# Patient Record
Sex: MALE | Race: WHITE | URBAN - NONMETROPOLITAN AREA
[De-identification: names, ages, dates, MRNs, and addresses within clinical notes are randomized per-mention and may not be internally consistent; named-entity substitution may affect disease eponyms.]

---

## 2017-01-20 ENCOUNTER — ANTICOAGULATION THERAPY VISIT (OUTPATIENT)
Dept: ANTICOAGULATION | Facility: OTHER | Age: 72
End: 2017-01-20
Payer: COMMERCIAL

## 2017-01-20 DIAGNOSIS — I48.0 PAROXYSMAL ATRIAL FIBRILLATION (H): ICD-10-CM

## 2017-01-20 DIAGNOSIS — I48.19 PERSISTENT ATRIAL FIBRILLATION (H): ICD-10-CM

## 2017-01-20 DIAGNOSIS — Z79.01 LONG-TERM (CURRENT) USE OF ANTICOAGULANTS: Primary | ICD-10-CM

## 2017-01-20 LAB — INR POINT OF CARE: 3 (ref 0.86–1.14)

## 2017-01-20 PROCEDURE — 85610 PROTHROMBIN TIME: CPT | Mod: QW

## 2017-01-20 PROCEDURE — 99207 ZZC NO CHARGE NURSE ONLY: CPT

## 2017-01-20 PROCEDURE — 36416 COLLJ CAPILLARY BLOOD SPEC: CPT

## 2017-01-20 NOTE — PROGRESS NOTES
ANTICOAGULATION FOLLOW-UP CLINIC VISIT    Patient Name:  Brodie Cai  Date:  1/20/2017  Contact Type:  Face to Face    SUBJECTIVE:     Patient Findings     Positives No Problem Findings           OBJECTIVE    INR PROTIME   Date Value Ref Range Status   01/20/2017 3.0* 0.86 - 1.14 Final       ASSESSMENT / PLAN  INR assessment THER    Recheck INR In: 6 WEEKS    INR Location Clinic      Anticoagulation Summary as of 1/20/2017     INR goal 2.0-3.0   Selected INR 3.0 (1/20/2017)   Maintenance plan 7.5 mg (5 mg x 1.5) on Tue, Thu, Sat; 10 mg (5 mg x 2) all other days   Full instructions 7.5 mg on Tue, Thu, Sat; 10 mg all other days   Weekly total 62.5 mg   No change documented Elizabeth Alcantara RN   Plan last modified Elizabeth Alcantara RN (3/25/2016)   Next INR check 3/3/2017   Target end date     Indications   Long-term (current) use of anticoagulants [Z79.01] [Z79.01]  Persistent atrial fibrillation (H) [I48.1]  Paroxysmal atrial fibrillation (H) [I48.0]         Anticoagulation Episode Summary     INR check location     Preferred lab     Send INR reminders to [facility]    Comments       Anticoagulation Care Providers     Provider Role Specialty Phone number    Joshua Arizmendi DO [facility] Internal Medicine [phone]            See the Encounter Report to view Anticoagulation Flowsheet and Dosing Calendar (Go to Encounters tab in chart review, and find the Anticoagulation Therapy Visit)    Dosage adjustment made based on physician directed care plan.    Elizabeth Alcantara RN

## 2017-01-26 ENCOUNTER — TELEPHONE (OUTPATIENT)
Dept: INTERNAL MEDICINE | Facility: CLINIC | Age: 72
End: 2017-01-26

## 2017-01-26 NOTE — TELEPHONE ENCOUNTER
Panel Management Review      Patient has the following on his problem list:     Diabetes    ASA: Not Required     Last A1C  A1C      8.0   9/27/2016  A1C      8.6   4/5/2016  A1C      6.8   9/10/2015  A1C      7.9   4/28/2015  A1C      7.9   1/20/2015  A1C tested: Failed    Last LDL:    CHOL      119   4/5/2016  HDL       37   4/5/2016  LDL       55   4/5/2016  TRIG      137   4/5/2016  CHOLHDLRATIO      3.4   1/20/2015  NHDL       82   4/5/2016    Is the patient on a Statin? YES             Is the patient on Aspirin? NO    Medications     HMG CoA Reductase Inhibitors    lovastatin (MEVACOR) 40 MG tablet          Last three blood pressure readings:  BP Readings from Last 3 Encounters:   09/27/16 136/92   05/26/16 136/78   04/05/16 118/72       Date of last diabetes office visit: 9/27/2016     Tobacco History:     History   Smoking status     Former Smoker   Smokeless tobacco     Former User     Quit date: 01/01/1986             Composite cancer screening  Chart review shows that this patient is due/due soon for the following None  Summary:    Patient is due/failing the following:   A1C and FOLLOW UP    Action needed:   Patient needs office visit for diabetes.    Type of outreach:    phone    Questions for provider review:    None                                                                                                                                    Miguelina LUEVANO       Chart routed to Care Team .

## 2017-02-01 ENCOUNTER — OFFICE VISIT (OUTPATIENT)
Dept: FAMILY MEDICINE | Facility: OTHER | Age: 72
End: 2017-02-01
Payer: COMMERCIAL

## 2017-02-01 VITALS
DIASTOLIC BLOOD PRESSURE: 82 MMHG | HEART RATE: 88 BPM | HEIGHT: 69 IN | SYSTOLIC BLOOD PRESSURE: 132 MMHG | WEIGHT: 282 LBS | OXYGEN SATURATION: 96 % | TEMPERATURE: 96.4 F | BODY MASS INDEX: 41.77 KG/M2

## 2017-02-01 DIAGNOSIS — I48.19 PERSISTENT ATRIAL FIBRILLATION (H): ICD-10-CM

## 2017-02-01 DIAGNOSIS — I10 HYPERTENSION GOAL BP (BLOOD PRESSURE) < 140/90: ICD-10-CM

## 2017-02-01 DIAGNOSIS — E78.5 HYPERLIPIDEMIA WITH TARGET LDL LESS THAN 100: ICD-10-CM

## 2017-02-01 DIAGNOSIS — R06.02 SOB (SHORTNESS OF BREATH): ICD-10-CM

## 2017-02-01 DIAGNOSIS — I10 ESSENTIAL HYPERTENSION WITH GOAL BLOOD PRESSURE LESS THAN 140/90: ICD-10-CM

## 2017-02-01 DIAGNOSIS — Z13.6 SCREENING FOR ABDOMINAL AORTIC ANEURYSM: ICD-10-CM

## 2017-02-01 DIAGNOSIS — F90.2 ATTENTION DEFICIT HYPERACTIVITY DISORDER (ADHD), COMBINED TYPE: ICD-10-CM

## 2017-02-01 DIAGNOSIS — E11.9 TYPE 2 DIABETES MELLITUS WITHOUT COMPLICATION, WITH LONG-TERM CURRENT USE OF INSULIN (H): Primary | ICD-10-CM

## 2017-02-01 DIAGNOSIS — E66.09 NON MORBID OBESITY DUE TO EXCESS CALORIES: ICD-10-CM

## 2017-02-01 DIAGNOSIS — Z79.4 TYPE 2 DIABETES MELLITUS WITHOUT COMPLICATION, WITH LONG-TERM CURRENT USE OF INSULIN (H): Primary | ICD-10-CM

## 2017-02-01 LAB
ALBUMIN UR-MCNC: NEGATIVE MG/DL
ANION GAP SERPL CALCULATED.3IONS-SCNC: 7 MMOL/L (ref 3–14)
APPEARANCE UR: CLEAR
BILIRUB UR QL STRIP: NEGATIVE
BUN SERPL-MCNC: 16 MG/DL (ref 7–30)
CALCIUM SERPL-MCNC: 9.5 MG/DL (ref 8.5–10.1)
CHLORIDE SERPL-SCNC: 106 MMOL/L (ref 94–109)
CHOLEST SERPL-MCNC: 141 MG/DL
CO2 SERPL-SCNC: 32 MMOL/L (ref 20–32)
COLOR UR AUTO: YELLOW
CREAT SERPL-MCNC: 0.87 MG/DL (ref 0.66–1.25)
GFR SERPL CREATININE-BSD FRML MDRD: 87 ML/MIN/1.7M2
GLUCOSE SERPL-MCNC: 169 MG/DL (ref 70–99)
GLUCOSE UR STRIP-MCNC: NEGATIVE MG/DL
HDLC SERPL-MCNC: 38 MG/DL
HGB UR QL STRIP: NEGATIVE
KETONES UR STRIP-MCNC: NEGATIVE MG/DL
LDLC SERPL CALC-MCNC: 75 MG/DL
LEUKOCYTE ESTERASE UR QL STRIP: NEGATIVE
NITRATE UR QL: NEGATIVE
NONHDLC SERPL-MCNC: 103 MG/DL
PH UR STRIP: 5 PH (ref 5–7)
POTASSIUM SERPL-SCNC: 3.8 MMOL/L (ref 3.4–5.3)
SODIUM SERPL-SCNC: 145 MMOL/L (ref 133–144)
SP GR UR STRIP: 1.01 (ref 1–1.03)
TRIGL SERPL-MCNC: 140 MG/DL
URN SPEC COLLECT METH UR: NORMAL
UROBILINOGEN UR STRIP-ACNC: 0.2 EU/DL (ref 0.2–1)

## 2017-02-01 PROCEDURE — 99397 PER PM REEVAL EST PAT 65+ YR: CPT | Performed by: INTERNAL MEDICINE

## 2017-02-01 PROCEDURE — 81003 URINALYSIS AUTO W/O SCOPE: CPT | Performed by: INTERNAL MEDICINE

## 2017-02-01 PROCEDURE — 80048 BASIC METABOLIC PNL TOTAL CA: CPT | Performed by: INTERNAL MEDICINE

## 2017-02-01 PROCEDURE — 36415 COLL VENOUS BLD VENIPUNCTURE: CPT | Performed by: INTERNAL MEDICINE

## 2017-02-01 PROCEDURE — 80061 LIPID PANEL: CPT | Performed by: INTERNAL MEDICINE

## 2017-02-01 RX ORDER — METOPROLOL TARTRATE 50 MG
TABLET ORAL
Qty: 180 TABLET | Refills: 1 | Status: SHIPPED | OUTPATIENT
Start: 2017-02-01 | End: 2017-04-11

## 2017-02-01 RX ORDER — CLONIDINE HYDROCHLORIDE 0.2 MG/1
0.2 TABLET ORAL 2 TIMES DAILY
Qty: 180 TABLET | Refills: 0 | Status: SHIPPED | OUTPATIENT
Start: 2017-02-01 | End: 2017-05-04

## 2017-02-01 ASSESSMENT — PAIN SCALES - GENERAL: PAINLEVEL: NO PAIN (0)

## 2017-02-01 NOTE — Clinical Note
CARDIAC NUCLEAR IMAGING STRESS TEST INSTRUCTIONS  PAMPHLET:  CARDIAC NUCLEAR IMAGING    Date of Appointment:____________________Time: ________                                                                                     [place] at the central registration desk.    INSTRUCTIONS:    1. NO  CAFFEINE FOR 24 HOURS PRIOR TO THE TEST.  Examples: tea, coffee, pop, Anacin, Excedrin and chocolate products.  Other products/medications may contain caffeine.  If in doubt, read the lables or call your pharmacist.    2. Nothing to eat or drink, except water for four hours prior to the test.    3. No smoking the day of the test.    4. Plan three to four hours for your test.    5. If you wear a NITRO-PATCH, do not put one on the morning of the test.    6. If you are diabetic, see specific Insulin instructions below.    7. Wear a comfortable two piece outfit and comfortable walking shoes.    8. Do not take beta blockers(see list below):  Do not take  Lopressor (day before the test ) and Lopressor (day of the test)    BETA BLOCKERS  Acebutolol, Atenolol, Beta-Chron, Betapace, Betaxolol, Bisoprolol,Blocadren, Carteolol, Cartrol, Carvediol, Coreg, Corgard, Corzide, Dectral, Fumarate, Inderal, Inderal LA, Inderide, Kerlone, Labetolol, Levatolol, Lopressor, Lopressor HCT, Metoprolol, Metoprolol XL, Nadolol, Normodyne, Penbutolol, Pindolol, Propanolol, Propanolol LA, Sectral, Sotalol, Tenoretic, Tenormin, Timolide, Timolol, Toprol XL, Trandate, Visken, Zebeta, Ziac    INSULIN PRODUCTS    For early AM appointments:  Oral, Regular, Humulin R or Humulin Br:  Do not take the morning of the test but bring the dose with you.   Any other Insulin, take   the dosage and bring the rest with.    For 11:00 AM and later appointments:  Eat breakfast four hours prior to appointment time and take AM diabetic medications as usual.    Bring a list of your medications to this appointment.    If you have any questions please call us at [phone] or  [phone] Monday through Friday 8am to 5pm.    PATIENT INFORMATION SHEET - CARDIAC NUCLEAR IMAGING STRESS TEST    You will be asked to fill out an information sheet regarding your current health status.  Questions will be asked related to your heart history.    The nurse or exercise physiologist will explain the procedure to you.  The upper chest will be exposed for placement of electrodes.  Women may wear a hospital gown during the test.  Men with chest hair will be shaved in the areas where the electrodes are placed.    An IV will be started and the first dose of the imaging solution will be given by the .  You will be escorted to the X-Ray waiting area.    There will be a waiting period of twenty to thirty minutes.  This allows the imaging solution to circulate. During this time you will be asked to drink juice or water.  You will also be asked to do leisurely walking to help circulate the imaging solution.    A scan will be performed.  This will take approximately twenty minutes.    There will be a waiting period of thirty to forty minutes.    You will be connected to an electrocardiogram machine.  An electrocardiogram and blood pressure will be monitored with you lying down and again standing still prior to exercising.    With staff in attendance, the treadmill will be started.  Periodically the speed and grade of the treadmill will increase.  Let the staff know if you are experiencing chest pain, shortness of breath or other symptoms.  While you are exercising on the treadmill a second dose of imaging material will be administered by the .    The length of time on the treadmill is individual.  However, the usual length of time is from five to ten minutes of walking.    There will be a 30 to 45 minute wait period while the imaging material circulates in your bloodstream.  You may go down to the hospital cafeteria to have something cold to drink.    You will be escorted  to the X-Ray department where a second scan will be performed.  This will take approximately twenty minutes.     The physician in attendance during your treadmill test will give you the electrocardiogram results after the completion of the test and will send a report to your Primary Care Provider.    The scan is read by the cardiologist at [facility] the following day.  Your primary physician will receive the report within five to seven days following the test.    The test will take approximately three to four hours.   Please bring along reading material to help pass the time while you are waiting.

## 2017-02-01 NOTE — PROGRESS NOTES
SUBJECTIVE:                                                            Brodie Cai is a 71 year old male who presents for Preventive Visit.  Are you in the first 12 months of your Medicare Part B coverage?  No    Healthy Habits:    Do you get at least three servings of calcium containing foods daily (dairy, green leafy vegetables, etc.)? yes    Amount of exercise or daily activities, outside of work: 3 day(s) per week    Problems taking medications regularly No    Medication side effects: No    Have you had an eye exam in the past two years? yes    Do you see a dentist twice per year? yes    Do you have sleep apnea, excessive snoring or daytime drowsiness?yes    COGNITIVE SCREEN  1) Repeat 3 items (Banana, Sunrise, Chair)    2) Clock draw: NORMAL  3) 3 item recall: Recalls 2 objects   Results: NORMAL clock, 1-2 items recalled: COGNITIVE IMPAIRMENT LESS LIKELY    Mini-CogTM Copyright S Calista. Licensed by the author for use in [facility]; reprinted with permission (debbie@[email or web address]). All rights reserved.            All Histories reviewed and updated in [facility] as appropriate.  Social History   Substance Use Topics     Smoking status: Former Smoker     Smokeless tobacco: Former User     Quit date: 01/01/1986     Alcohol Use: 0.0 oz/week     0 Standard drinks or equivalent per week      Comment: once a week        The patient does not drink >3 drinks per day nor >7 drinks per week.    Today's PHQ-2 Score:   PHQ-2 ( 1999 Pf[employer]) 2/1/2017 9/27/2016   Q1: Little interest or pleasure in doing things 0 0   Q2: Feeling down, depressed or hopeless 0 0   PHQ-2 Score 0 0       Do you feel safe in your environment - Yes    Do you have a Health Care Directive?: No: Advance care planning reviewed with patient; information given to patient to review.    Current providers sharing in care for this patient include:   Patient Care Team:  Joshua Arizmendi DO as PCP - General (Internal Medicine)      Hearing  impairment: Yes, has hearing aides    Ability to successfully perform activities of daily living: Yes, no assistance needed     Fall risk:  Fallen 2 or more times in the past year?: No  Any fall with injury in the past year?: No    Home safety:  none identified    The following health maintenance items are reviewed in Epic and correct as of today:  Health Maintenance   Topic Date Due     AORTIC ANEURYSM SCREENING (SYSTEM ASSIGNED)  07/27/2011     PNEUMOCOCCAL (2 of 2 - PPSV23) 11/26/2015     INFLUENZA VACCINE (SYSTEM ASSIGNED)  09/01/2016     EYE EXAM Q1 YEAR( NO INBASKET)  02/18/2017     A1C Q6 MO( NO INBASKET)  03/27/2017     FOOT EXAM Q1 YEAR( NO INBASKET)  04/05/2017     LIPID MONITORING Q1 YEAR( NO INBASKET)  04/05/2017     TSH W/ FREE T4 REFLEX Q2 YEAR (NO INBASKET)  04/28/2017     CREATININE Q1 YEAR (NO INBASKET)  09/27/2017     FALL RISK ASSESSMENT  09/27/2017     MICROALBUMIN Q1 YEAR( NO INBASKET)  09/27/2017     TETANUS IMMUNIZATION (SYSTEM ASSIGNED)  03/17/2019     ADVANCE DIRECTIVE PLANNING Q5 YRS (NO INBASKET)  01/20/2020     COLON CANCER SCREEN (SYSTEM ASSIGNED)  07/02/2024     HEPATITIS C SCREENING  Completed         Pneumonia Vaccine:Adults age 65+ who received Pneumovax (PPSV23) at 65 years or older: Should be given PCV13 > 1 year after their most recent PPSV23     ROS:  C: NEGATIVE for fever, chills, change in weight  I: NEGATIVE for worrisome rashes, moles or lesions  E: NEGATIVE for vision changes or irritation  E/M: NEGATIVE for ear, mouth and throat problems  R: Positive for shortness of breath associated with symptoms consistent with chronic obstructive pulmonary disease. He has dyspnea with exertion.   B: NEGATIVE for masses, tenderness or discharge  CV: Positive for history of atrial fibrillation. Also positive for recent severe dyspnea with exertion and vague nonspecific chest discomfort with exertion.  GI: NEGATIVE for nausea, abdominal pain, heartburn, or change in bowel habits  :  "NEGATIVE for frequency, dysuria, or hematuria  M: NEGATIVE for significant arthralgias or myalgia  N: NEGATIVE for weakness, dizziness or paresthesias  E: NEGATIVE for temperature intolerance, skin/hair changes  H: NEGATIVE for bleeding problems  P: NEGATIVE for changes in mood or affect    Problem list, Medication list, Allergies, and Medical/Social/Surgical histories reviewed in [facility] and updated as appropriate.  Labs reviewed in EPIC  BP Readings from Last 3 Encounters:   02/01/17 132/82   09/27/16 (!) 136/92   05/26/16 136/78    Wt Readings from Last 3 Encounters:   02/01/17 282 lb (127.9 kg)   09/27/16 298 lb (135.2 kg)   05/26/16 296 lb 6.4 oz (134.4 kg)                  OBJECTIVE:                                                            /82 mmHg  Pulse 88  Temp(Src) 96.4  F (35.8  C) (Temporal)  Ht 5' 9\" (1.753 m)  Wt 282 lb (127.914 kg)  BMI 41.63 kg/m2  SpO2 96% Estimated body mass index is 41.63 kg/(m^2) as calculated from the following:    Height as of this encounter: 5' 9\" (1.753 m).    Weight as of this encounter: 282 lb (127.914 kg).  EXAM:   GENERAL: healthy, alert and no distress  EYES: Eyes grossly normal to inspection, PERRL and conjunctivae and sclerae normal  HENT: ear canals and TM's normal, nose and mouth without ulcers or lesions  NECK: no adenopathy, no asymmetry, masses, or scars and thyroid normal to palpation  RESP: lungs clear to auscultation - no rales, rhonchi or wheezes  CV: irregularly irregular rhythm, normal S1 S2, no S3 or S4, no murmur, click or rub, peripheral pulses strong and no peripheral edema  ABDOMEN: soft, nontender, no hepatosplenomegaly, no masses and bowel sounds normal   (male): normal male genitalia without lesions or urethral discharge, no hernia  MS: no gross musculoskeletal defects noted, no edema  SKIN: no suspicious lesions or rashes  NEURO: Normal strength and tone, mentation intact and speech normal  PSYCH: mentation appears normal, affect " "normal/bright  LYMPH: no cervical, supraclavicular, axillary, or inguinal adenopathy  Diabetic foot exam: normal DP and PT pulses, no trophic changes or ulcerative lesions and normal sensory exam    ASSESSMENT / PLAN:                                                                ICD-10-CM    1. Type 2 diabetes mellitus without complication, with long-term current use of insulin (H) E11.9 *UA reflex to Microscopic and Culture ([facility], [place] and [facility] (except [place] and [place])    Z79.4    2. Persistent atrial fibrillation (H) I48.1    3. Hypertension goal BP (blood pressure) < 140/90 I10 cloNIDine (CATAPRES) 0.2 MG tablet     Basic metabolic panel   4. Hyperlipidemia with target LDL less than 100 E78.5 Lipid Profile   5. Essential hypertension with goal blood pressure less than 140/90 I10 metoprolol (LOPRESSOR) 50 MG tablet   6. SOB (shortness of breath) R06.02 General PFT Lab (Please always keep checked)     Pulmonary Function Test     NM Exercise stress test   7. Non morbid obesity due to excess calories E66.09    8. Attention deficit hyperactivity disorder (ADHD), combined type F90.2    9. Screening for abdominal aortic aneurysm Z13.6 US abdominal aorta limited       End of Life Planning:  Patient currently has an advanced directive: Yes.  Practitioner is supportive of decision.    COUNSELING:  Reviewed preventive health counseling, as reflected in patient instructions       Consider AAA screening for ages 65-75 and smoking history       Regular exercise       Healthy diet/nutrition       Vision screening       Hearing screening        Estimated body mass index is 41.63 kg/(m^2) as calculated from the following:    Height as of this encounter: 5' 9\" (1.753 m).    Weight as of this encounter: 282 lb (127.914 kg).  Weight management plan: Discussed healthy diet and exercise guidelines and patient will follow up in 3 months in clinic to re-evaluate.   reports that he has quit smoking. He quit " smokeless tobacco use about 31 years ago.      Appropriate preventive services were discussed with this patient, including applicable screening as appropriate for cardiovascular disease, diabetes, osteopenia/osteoporosis, and glaucoma.  As appropriate for age/gender, discussed screening for colorectal cancer, prostate cancer, breast cancer, and cervical cancer. Checklist reviewing preventive services available has been given to the patient.    Reviewed patients plan of care and provided an AVS. The Basic Care Plan (routine screening as documented in Health Maintenance) for Brodie meets the Care Plan requirement. This Care Plan has been established and reviewed with the Patient.    Counseling Resources:  ATP IV Guidelines  Pooled Cohorts Equation Calculator  Breast Cancer Risk Calculator  FRAX Risk Assessment  ICSI Preventive Guidelines  Dietary Guidelines for Americans, 2010  USDA's MyPlate  ASA Prophylaxis  Lung CA Screening    Joshua Arizmendi DO  [facility]

## 2017-02-01 NOTE — MR AVS SNAPSHOT
After Visit Summary   2/1/2017    Brodie Cai    MRN: [id number]           Patient Information     Date Of Birth          1945        Visit Information        Provider Department      2/1/2017 10:00 AM Joshua Arizmendi DO [facility]        Today's Diagnoses     Type 2 diabetes mellitus without complication, with long-term current use of insulin (H)    -  1     Persistent atrial fibrillation (H)         Hypertension goal BP (blood pressure) < 140/90         Hyperlipidemia with target LDL less than 100         Essential hypertension with goal blood pressure less than 140/90         SOB (shortness of breath)         Non morbid obesity due to excess calories         Attention deficit hyperactivity disorder (ADHD), combined type         Screening for abdominal aortic aneurysm           Care Instructions      Preventive Health Recommendations:       Male Ages 65 and over    Yearly exam:             See your health care provider every year in order to  o   Review health changes.   o   Discuss preventive care.    o   Review your medicines if your doctor has prescribed any.    Talk with your health care provider about whether you should have a test to screen for prostate cancer (PSA).    Every 3 years, have a diabetes test (fasting glucose). If you are at risk for diabetes, you should have this test more often.    Every 5 years, have a cholesterol test. Have this test more often if you are at risk for high cholesterol or heart disease.     Every 10 years, have a colonoscopy. Or, have a yearly FIT test (stool test). These exams will check for colon cancer.    Talk to with your health care provider about screening for Abdominal Aortic Aneurysm if you have a family history of AAA or have a history of smoking.  Shots:     Get a flu shot each year.     Get a tetanus shot every 10 years.     Talk to your doctor about your pneumonia vaccines. There are now two you should receive -  Pneumovax (PPSV 23) and Prevnar (PCV 13).    Talk to your doctor about a shingles vaccine.     Talk to your doctor about the hepatitis B vaccine.  Nutrition:     Eat at least 5 servings of fruits and vegetables each day.     Eat whole-grain bread, whole-wheat pasta and brown rice instead of white grains and rice.     Talk to your doctor about Calcium and Vitamin D.   Lifestyle    Exercise for at least 150 minutes a week (30 minutes a day, 5 days a week). This will help you control your weight and prevent disease.     Limit alcohol to one drink per day.     No smoking.     Wear sunscreen to prevent skin cancer.     See your dentist every six months for an exam and cleaning.     See your eye doctor every 1 to 2 years to screen for conditions such as glaucoma, macular degeneration and cataracts.        Follow-ups after your visit        Your next 10 appointments already scheduled     Feb 07, 2017 12:00 PM   NM MPI ADENOSINE with PHNM1, NL STRESS TEST, PMC RM1   [facility] Nuclear Medicine ([facility])    [address] [place] [id number] [phone]           For a ONE day exam: Allow 3-4 hours for test. For a TWO day exam: Allow 2 hours PER day for test.  You may need to stop some medicines before the test. Follow your doctor s orders. - If you take a beta blocker: Follow your doctor s specific instructions on taking it prior to and on the day of your exam. - If you take Aggrenox or dipyridamole (Persantine, Permole), stop taking it 48 hours before your test. - If you take Viagra, Cialis or Levitra, stop taking it 48 hours before your test. - If you take theophylline or aminophylline, stop taking it 12 hours before your test.  For patients with diabetes: - If you take insulin, call your diabetes care team. Ask if you should take a 1/2 dose the morning of your test. - If you take diabetes medicine by mouth, don t take it on the morning of your test. Bring it with you to take after  the test. (If you have questions, call your diabetes care team.)  Do not take nitrates on the day of your test. Do not wear your Nitro-Patch.  Stop all caffeine 12 hours before the test. This includes coffee, tea, soda pop, chocolate and certain medicines (such as Anacin, Excedrin and NoDoz). Also avoid decaf coffee and tea, as these contain small amounts of caffeine.  No alcohol, smoking or other tobacco for 12 hours before the test.  Stop eating 3 hours before the test. You may drink water.  Please wear a loose two-piece outfit. If you will have an exercise test, bring rubber-soled walking shoes.  When you arrive, please tell us if you: - Have diabetes - Are breastfeeding - May be pregnant - Have a pacemaker of ICD (implantable defibrillator).  Please call your Imaging Department at your exam site with any questions.            Feb 09, 2017 10:00 AM   NM MPI ADENOSINE with PHNM1, NL STRESS TEST, PMC RM1   [facility] Nuclear Medicine ([facility])    [address] [place] [address] [phone]           For a ONE day exam: Allow 3-4 hours for test. For a TWO day exam: Allow 2 hours PER day for test.  You may need to stop some medicines before the test. Follow your doctor s orders. - If you take a beta blocker: Follow your doctor s specific instructions on taking it prior to and on the day of your exam. - If you take Aggrenox or dipyridamole (Persantine, Permole), stop taking it 48 hours before your test. - If you take Viagra, Cialis or Levitra, stop taking it 48 hours before your test. - If you take theophylline or aminophylline, stop taking it 12 hours before your test.  For patients with diabetes: - If you take insulin, call your diabetes care team. Ask if you should take a 1/2 dose the morning of your test. - If you take diabetes medicine by mouth, don t take it on the morning of your test. Bring it with you to take after the test. (If you have questions, call your  diabetes care team.)  Do not take nitrates on the day of your test. Do not wear your Nitro-Patch.  Stop all caffeine 12 hours before the test. This includes coffee, tea, soda pop, chocolate and certain medicines (such as Anacin, Excedrin and NoDoz). Also avoid decaf coffee and tea, as these contain small amounts of caffeine.  No alcohol, smoking or other tobacco for 12 hours before the test.  Stop eating 3 hours before the test. You may drink water.  Please wear a loose two-piece outfit. If you will have an exercise test, bring rubber-soled walking shoes.  When you arrive, please tell us if you: - Have diabetes - Are breastfeeding - May be pregnant - Have a pacemaker of ICD (implantable defibrillator).  Please call your Imaging Department at your exam site with any questions.            Feb 09, 2017 10:30 AM   US ABDOMINAL AORTIC LIMITED with PHUS1   [facility] Ultrasound ([facility])    [address] [place] [address] [phone]           Please bring a list of your medicines (including vitamins, minerals and over-the-counter drugs). Also, tell your doctor about any allergies you may have. Wear comfortable clothes and leave your valuables at home.  Adults: No eating or drinking for 8 hours before the exam. You may take medicine with a small sip of water.  Children: - Children 6+ years: No food or drink for 6 hours before exam. - Children 1-5 years: No food or drink for 4 hours before exam. - Infants, breast-fed: may have breast milk up to 2 hours before exam. - Infants, formula: may have bottle until 4 hours before exam.  Please call the Imaging Department at your exam site with any questions.            Mar 03, 2017 10:30 AM   Anticoagulation Visit with  ANTI COAG   [facility]    [address] [facility] [address] [phone]              Future tests that were ordered for you today     Open Future Orders        Priority  "Expected Expires Ordered    NM Exercise stress test Routine  2018    US abdominal aorta limited Routine  2018    General PFT Lab (Please always keep checked) Routine  2018    Pulmonary Function Test Routine  2018            Who to contact     If you have questions or need follow up information about today's clinic visit or your schedule please contact [facility] directly at [phone].  Normal or non-critical lab and imaging results will be communicated to you by MyChart, letter or phone within 4 business days after the clinic has received the results. If you do not hear from us within 7 days, please contact the clinic through [employer] or phone. If you have a critical or abnormal lab result, we will notify you by phone as soon as possible.  Submit refill requests through [employer] or call your pharmacy and they will forward the refill request to us. Please allow 3 business days for your refill to be completed.          Additional Information About Your Visit        [employer] Information     [employer] lets you send messages to your doctor, view your test results, renew your prescriptions, schedule appointments and more. To sign up, go to [email or web address] . Click on \"Log in\" on the left side of the screen, which will take you to the Welcome page. Then click on \"Sign up Now\" on the right side of the page.     You will be asked to enter the access code listed below, as well as some personal information. Please follow the directions to create your username and password.     Your access code is: [email or web address]  Expires: 2017 10:36 AM     Your access code will  in 90 days. If you need help or a new code, please call your [place] clinic or [phone].        Care EveryWhere ID     This is your Care EveryWhere ID. This could be used by other organizations to access your [place] medical records  [id number]        Your Vitals Were     Pulse " "Temperature Height BMI (Body Mass Index) Pulse Oximetry       88 96.4  F (35.8  C) (Temporal) 5' 9\" (1.753 m) 41.63 kg/m2 96%        Blood Pressure from Last 3 Encounters:   02/01/17 132/82   09/27/16 136/92   05/26/16 136/78    Weight from Last 3 Encounters:   02/01/17 282 lb (127.914 kg)   09/27/16 298 lb (135.172 kg)   05/26/16 296 lb 6.4 oz (134.446 kg)              We Performed the Following     *UA reflex to Microscopic and Culture ([facility], [place] and [facility] (except [place] and [place])     Basic metabolic panel     Lipid Profile          Today's Medication Changes          These changes are accurate as of: 2/1/17 11:01 AM.  If you have any questions, ask your nurse or doctor.               Start taking these medicines.        Dose/Directions    cloNIDine 0.2 MG tablet   Commonly known as:  CATAPRES   Used for:  Hypertension goal BP (blood pressure) < 140/90   Replaces:  cloNIDine 0.2 MG/24HR WK patch   Started by:  Joshua Arizmendi DO        Dose:  0.2 mg   Take 1 tablet (0.2 mg) by mouth 2 times daily   Quantity:  180 tablet   Refills:  0         These medicines have changed or have updated prescriptions.        Dose/Directions    metoprolol 50 MG tablet   Commonly known as:  LOPRESSOR   This may have changed:  medication strength   Used for:  Essential hypertension with goal blood pressure less than 140/90   Changed by:  Joshua Arizmendi DO        TAKE 1 AND 1/2 TABLETS TWICE DAILY   Quantity:  180 tablet   Refills:  1         Stop taking these medicines if you haven't already. Please contact your care team if you have questions.     cloNIDine 0.2 MG/24HR WK patch   Commonly known as:  CATAPRES-TTS2   Replaced by:  cloNIDine 0.2 MG tablet   Stopped by:  Joshua Arizmendi DO                Where to get your medicines      These medications were sent to [facility] [place], [place] - [address]  [address] , [facility] [address]     Phone:  " [phone]    - cloNIDine 0.2 MG tablet  - metoprolol 50 MG tablet             Primary Care Provider Office Phone # Fax #    Joshua Arizmendi [phone]       [address] [place] [address]        Thank you!     Thank you for choosing [facility]  for your care. Our goal is always to provide you with excellent care. Hearing back from our patients is one way we can continue to improve our services. Please take a few minutes to complete the written survey that you may receive in the mail after your visit with us. Thank you!             Your Updated Medication List - Protect others around you: Learn how to safely use, store and throw away your medicines at www.disposemymeds.org.          This list is accurate as of: 2/1/17 11:01 AM.  Always use your most recent med list.                   Brand Name Dispense Instructions for use    Alcohol Wipes 70 % Pads     300 packet    Wipe area before testing.       ALEVE 220 MG tablet   Generic drug:  naproxen sodium      Take 220 mg by mouth 2 times daily (with meals) Take 2 tabs       amLODIPine 10 MG tablet    NORVASC    90 tablet    Take 1 tablet (10 mg) by mouth daily       blood glucose calibration solution     1 Bottle    Use to calibrate blood glucose monitor as needed as directed.       blood glucose monitoring test strip    ACCU-CHEK AURELIO PLUS    1 Box    USE  TO  TEST  BLOOD  SUGAR THREE TIMES DAILY  OR AS DIRECTED       cloNIDine 0.2 MG tablet    CATAPRES    180 tablet    Take 1 tablet (0.2 mg) by mouth 2 times daily       fluticasone-salmeterol 250-50 MCG/DOSE diskus inhaler    ADVAIR    3 Inhaler    Inhale 1 puff into the lungs 2 times daily       furosemide 20 MG tablet    LASIX    90 tablet    Take 1 tablet (20 mg) by mouth daily       glipiZIDE 10 MG tablet    GLUCOTROL    180 tablet    Take 1 tablet (10 mg) by mouth 2 times daily (before meals)       insulin glargine 100 UNIT/ML injection     LANTUS SOLOSTAR    3 Month    50 units ONCE daily       insulin syringes (disposable) U-100 1 ML Misc     3 Box    1 Device 2 times daily       lisinopril 40 MG tablet    PRINIVIL/ZESTRIL    90 tablet    Take 1 tablet (40 mg) by mouth daily       lovastatin 40 MG tablet    MEVACOR    180 tablet    Take 1 tablet (40 mg) by mouth 2 times daily       metFORMIN 1000 MG tablet    GLUCOPHAGE    180 tablet    TAKE 1 TABLET TWICE DAILY WITH MEALS       metoprolol 50 MG tablet    LOPRESSOR    180 tablet    TAKE 1 AND 1/2 TABLETS TWICE DAILY       * order for DME     1 Units    Equipment being ordered: Energizer Watch Battery 3V [id number]       order for DME     1 Device    Equipment being ordered: CPAP       * order for DME     1 Device    Equipment being ordered: CPAP machine and supplies       sildenafil 100 MG cap/tab    REVATIO/VIAGRA    12 tablet    Take 0.5-1 tablets ( mg) by mouth daily as needed for erectile dysfunction Take 30 min to 4 hours before intercourse.  Never use with nitroglycerin, terazosin or doxazosin.       sitagliptin 100 MG tablet    JANUVIA    90 tablet    Take 1 tablet (100 mg) by mouth daily       tamsulosin 0.4 MG capsule    FLOMAX    90 capsule    Take 1 capsule (0.4 mg) by mouth daily       VITAMIN D3 PO      Take 1,000 Units by mouth daily       warfarin 5 MG tablet    COUMADIN    150 tablet    Take  7.5 mg (1.5 tablets) on Tuesday, Thursday, Saturday and 10 mg (2 tabs) the rest of the week or as directed by the coumadin clinic.       * Notice:  This list has 2 medication(s) that are the same as other medications prescribed for you. Read the directions carefully, and ask your doctor or other care provider to review them with you.

## 2017-02-01 NOTE — NURSING NOTE
"Chief Complaint   Patient presents with     Wellness Visit     Medicare Visit       Initial /82 mmHg  Pulse 88  Temp(Src) 96.4  F (35.8  C) (Temporal)  Ht 5' 9\" (1.753 m)  Wt 282 lb (127.914 kg)  BMI 41.63 kg/m2  SpO2 96% Estimated body mass index is 41.63 kg/(m^2) as calculated from the following:    Height as of this encounter: 5' 9\" (1.753 m).    Weight as of this encounter: 282 lb (127.914 kg).  BP completed using cuff size: lupe LUEVANO    "

## 2017-02-07 ENCOUNTER — HOSPITAL ENCOUNTER (OUTPATIENT)
Dept: NUCLEAR MEDICINE | Facility: CLINIC | Age: 72
Setting detail: NUCLEAR MEDICINE
Discharge: HOME | End: 2017-02-09
Attending: INTERNAL MEDICINE | Admitting: INTERNAL MEDICINE
Payer: COMMERCIAL

## 2017-02-07 ENCOUNTER — TELEPHONE (OUTPATIENT)
Dept: FAMILY MEDICINE | Facility: OTHER | Age: 72
End: 2017-02-07

## 2017-02-07 DIAGNOSIS — E11.65 TYPE 2 DIABETES MELLITUS WITH HYPERGLYCEMIA (H): ICD-10-CM

## 2017-02-07 DIAGNOSIS — E11.9 CONTROLLED TYPE 2 DIABETES MELLITUS WITHOUT COMPLICATION, WITH LONG-TERM CURRENT USE OF INSULIN (H): ICD-10-CM

## 2017-02-07 DIAGNOSIS — I48.19 PERSISTENT ATRIAL FIBRILLATION (H): ICD-10-CM

## 2017-02-07 DIAGNOSIS — Z79.4 CONTROLLED TYPE 2 DIABETES MELLITUS WITHOUT COMPLICATION, WITH LONG-TERM CURRENT USE OF INSULIN (H): ICD-10-CM

## 2017-02-07 DIAGNOSIS — R06.02 SOB (SHORTNESS OF BREATH): ICD-10-CM

## 2017-02-07 DIAGNOSIS — I10 ESSENTIAL HYPERTENSION WITH GOAL BLOOD PRESSURE LESS THAN 140/90: ICD-10-CM

## 2017-02-07 DIAGNOSIS — E78.5 HYPERLIPIDEMIA WITH TARGET LDL LESS THAN 100: ICD-10-CM

## 2017-02-07 DIAGNOSIS — E11.9 TYPE 2 DIABETES, HBA1C GOAL < 8% (H): ICD-10-CM

## 2017-02-07 DIAGNOSIS — E11.9 TYPE 2 DIABETES MELLITUS (H): Primary | ICD-10-CM

## 2017-02-07 DIAGNOSIS — I10 HYPERTENSION GOAL BP (BLOOD PRESSURE) < 140/90: ICD-10-CM

## 2017-02-07 PROCEDURE — 25000128 H RX IP 250 OP 636: Performed by: INTERNAL MEDICINE

## 2017-02-07 PROCEDURE — 93018 CV STRESS TEST I&R ONLY: CPT | Performed by: INTERNAL MEDICINE

## 2017-02-07 PROCEDURE — 93016 CV STRESS TEST SUPVJ ONLY: CPT | Performed by: INTERNAL MEDICINE

## 2017-02-07 PROCEDURE — 34300033 ZZH RX 343: Performed by: INTERNAL MEDICINE

## 2017-02-07 PROCEDURE — A9502 TC99M TETROFOSMIN: HCPCS | Performed by: INTERNAL MEDICINE

## 2017-02-07 PROCEDURE — 93017 CV STRESS TEST TRACING ONLY: CPT | Performed by: REHABILITATION PRACTITIONER

## 2017-02-07 RX ORDER — CLONIDINE HYDROCHLORIDE 0.2 MG/1
0.2 TABLET ORAL 2 TIMES DAILY
Qty: 180 TABLET | Refills: 1 | Status: CANCELLED | COMMUNITY
Start: 2017-02-07

## 2017-02-07 RX ORDER — TAMSULOSIN HYDROCHLORIDE 0.4 MG/1
0.4 CAPSULE ORAL DAILY
Qty: 90 CAPSULE | Refills: 1 | Status: CANCELLED | COMMUNITY
Start: 2017-02-07

## 2017-02-07 RX ORDER — REGADENOSON 0.08 MG/ML
0.4 INJECTION, SOLUTION INTRAVENOUS ONCE
Status: COMPLETED | OUTPATIENT
Start: 2017-02-07 | End: 2017-02-07

## 2017-02-07 RX ORDER — LOVASTATIN 40 MG
40 TABLET ORAL 2 TIMES DAILY
Qty: 180 TABLET | Refills: 1 | Status: CANCELLED | COMMUNITY
Start: 2017-02-07

## 2017-02-07 RX ORDER — BLOOD-GLUCOSE METER
EACH MISCELLANEOUS
Qty: 1 KIT | Refills: 0 | Status: CANCELLED | COMMUNITY
Start: 2017-02-07

## 2017-02-07 RX ORDER — BLOOD GLUCOSE CONTROL HIGH,LOW
EACH MISCELLANEOUS
Qty: 3 BOTTLE | Refills: 3 | Status: CANCELLED | COMMUNITY
Start: 2017-02-07

## 2017-02-07 RX ORDER — FUROSEMIDE 20 MG
20 TABLET ORAL DAILY
Qty: 90 TABLET | Refills: 1 | Status: CANCELLED | COMMUNITY
Start: 2017-02-07

## 2017-02-07 RX ORDER — AMLODIPINE BESYLATE 10 MG/1
10 TABLET ORAL DAILY
Qty: 90 TABLET | Refills: 1 | Status: CANCELLED | COMMUNITY
Start: 2017-02-07

## 2017-02-07 RX ORDER — WARFARIN SODIUM 5 MG/1
TABLET ORAL
Qty: 162 TABLET | Refills: 1 | Status: CANCELLED | COMMUNITY
Start: 2017-02-07

## 2017-02-07 RX ORDER — GLIPIZIDE 10 MG/1
10 TABLET ORAL
Qty: 180 TABLET | Refills: 1 | Status: CANCELLED | COMMUNITY
Start: 2017-02-07

## 2017-02-07 RX ORDER — LANCETS
EACH MISCELLANEOUS
Qty: 3 BOX | Refills: 1 | Status: CANCELLED | COMMUNITY
Start: 2017-02-07

## 2017-02-07 RX ORDER — METOPROLOL TARTRATE 50 MG
TABLET ORAL
Qty: 270 TABLET | Refills: 1 | Status: CANCELLED | COMMUNITY
Start: 2017-02-07

## 2017-02-07 RX ORDER — LISINOPRIL 40 MG/1
40 TABLET ORAL DAILY
Qty: 90 TABLET | Refills: 1 | Status: CANCELLED | COMMUNITY
Start: 2017-02-07

## 2017-02-07 RX ADMIN — REGADENOSON 0.4 MG: 0.08 INJECTION, SOLUTION INTRAVENOUS at 10:07

## 2017-02-07 RX ADMIN — Medication 30 MILLICURIE: at 10:15

## 2017-02-09 ENCOUNTER — HOSPITAL ENCOUNTER (OUTPATIENT)
Dept: RESPIRATORY THERAPY | Facility: CLINIC | Age: 72
End: 2017-02-09
Attending: INTERNAL MEDICINE
Payer: COMMERCIAL

## 2017-02-09 ENCOUNTER — HOSPITAL ENCOUNTER (OUTPATIENT)
Dept: NUCLEAR MEDICINE | Facility: CLINIC | Age: 72
Setting detail: NUCLEAR MEDICINE
End: 2017-02-09
Attending: INTERNAL MEDICINE
Payer: COMMERCIAL

## 2017-02-09 ENCOUNTER — HOSPITAL ENCOUNTER (OUTPATIENT)
Dept: ULTRASOUND IMAGING | Facility: CLINIC | Age: 72
Discharge: HOME | End: 2017-02-09
Attending: INTERNAL MEDICINE | Admitting: INTERNAL MEDICINE
Payer: COMMERCIAL

## 2017-02-09 VITALS — OXYGEN SATURATION: 94 %

## 2017-02-09 DIAGNOSIS — R06.02 SOB (SHORTNESS OF BREATH): ICD-10-CM

## 2017-02-09 DIAGNOSIS — Z13.6 SCREENING FOR ABDOMINAL AORTIC ANEURYSM: ICD-10-CM

## 2017-02-09 PROCEDURE — 78452 HT MUSCLE IMAGE SPECT MULT: CPT | Mod: 26 | Performed by: INTERNAL MEDICINE

## 2017-02-09 PROCEDURE — A9502 TC99M TETROFOSMIN: HCPCS | Performed by: INTERNAL MEDICINE

## 2017-02-09 PROCEDURE — 34300033 ZZH RX 343: Performed by: INTERNAL MEDICINE

## 2017-02-09 PROCEDURE — 78452 HT MUSCLE IMAGE SPECT MULT: CPT

## 2017-02-09 PROCEDURE — 93018 CV STRESS TEST I&R ONLY: CPT | Performed by: INTERNAL MEDICINE

## 2017-02-09 RX ORDER — ALBUTEROL SULFATE 0.83 MG/ML
2.5 SOLUTION RESPIRATORY (INHALATION)
Status: COMPLETED | OUTPATIENT
Start: 2017-02-09 | End: 2017-02-09

## 2017-02-09 RX ADMIN — Medication 32 MILLICURIE: at 09:36

## 2017-02-09 RX ADMIN — ALBUTEROL SULFATE 2.5 MG: 2.5 SOLUTION RESPIRATORY (INHALATION) at 14:24

## 2017-02-09 NOTE — LETTER
[facility] ULTRASOUND  [address] [address]  [phone]        February 16, 2017    Brodie Cai  [address] [facility] [address]              Dear Brodie Cai      IMAGING RESULTS:     The results of your recent ultrasound showed  no evidence of an abdominal aortic aneurysm  If you have any further questions or problems, please contact our office.        Sincerely,        Joshua Arizmendi, DO

## 2017-02-09 NOTE — PROGRESS NOTES
The FVC, FEV1, FEV1/FVC ratio and SCS11-14% are reduced indicating airway obstruction.   The inspiratory flow rates are reduced.  The FVC is reduced relative to the SVC indicating air trapping.  The airway resistance is normal.  The SVC is reduced, but the TLC is within normal limits.  Following administration of bronchodilators, there is no significant response.  The diffusing capacity is normal.  However, the diffusing capacity was not corrected for the patient's hemoglobin.    Minimal airway obstruction is present.    IMPRESSION:  Minimal Airflow Obstruction         This preliminary report should not be used clinically unless reviewed and signed by a physician.

## 2017-02-09 NOTE — DISCHARGE INSTRUCTIONS
Thank you for completing pulmonary function testing today.  All results will be scanned into your epic results for your doctor to review.  Please resume taking all your current prescribed medications and diet as directed by your provider.   If you have not heard from your provider about your testing within two weeks and do not have a follow-up appointment scheduled with them please contact your provider about any questions you have concerning your testing.   Thank you  [facility] Pulmonary Function Lab

## 2017-02-09 NOTE — PROGRESS NOTES
Pre-Bronch    Post-Bronch         Actual Pred %Pred  Actual %Pred %Chng       ---- SPIROMETRY ----                          FVC (L) 2.71 4.09 66   2.95 72 +8            FEV1 (L) 1.94 3.10 62   2.19 70 +13            FEV1/FVC (%) 71 76     74   +3            FEV1/SVC (%) 63 68                      FEV1/FEV6 (%) 72 78     75   +5            FEF Max (L/sec) 6.56 8.08 81   6.01 74 -8            FEF 25-75% (L/sec) 1.24 2.37 52   2.05 86 +64            FIVC (L) 2.43       2.56   +5            FIF Max (L/sec) 1.98 6.83 28   3.52 51 +78            Expiratory Time (sec) 7.08       7.47   +5            ----LUNG MECHANICS                           MVV (L/min)   124                      MEP (cmH2O)                          MIP (cmH2O)                          ---- LUNG VOLUMES ----                          SVC (L) 3.09 4.57 67                    IC (L) 2.62 4.25 61                    ERV (L) 0.46 0.32 145                    FRC(Pleth) (L) 3.95 3.65 108                    RV (Pleth) (L) 3.48 2.62 132                    TLC (Pleth) (L) 6.57 6.92 94                    RV/TLC (Pleth) (%) 53 41                      ---- DIFFUSION ----                          DLCOunc (ml/min/mmHg) 23.34 25.60 91                    DLCOcor (ml/min/mmHg)   25.60                      DL/VA (ml/min/mmHg/L) 4.54 3.86                      VA (L) 5.14 6.63 77                    IVC (L) 2.67                        Hgb (gm/dL)   12-18                      ---- BLOOD GASES ----

## 2017-02-13 ENCOUNTER — DOCUMENTATION ONLY (OUTPATIENT)
Dept: OTHER | Facility: CLINIC | Age: 72
End: 2017-02-13

## 2017-02-13 DIAGNOSIS — Z71.89 ADVANCED DIRECTIVES, COUNSELING/DISCUSSION: Chronic | ICD-10-CM

## 2017-02-15 NOTE — PROGRESS NOTES
Please contact the patient and notify him of the following:  The cardiac stress test appears to be normal. No evidence of myocardial ischemia is seen.      Thank you.  DO KELSIE Limon

## 2017-02-15 NOTE — PROGRESS NOTES
Please contact the patient and notify him of the following:  There is no evidence of an abdominal aortic aneurysm.      Thank you.  DO KELSIE Limon

## 2017-02-16 NOTE — PROGRESS NOTES
Please contact the patient and notify him of the following:  His breathing test is consistent with some mild restriction. Pulmonology reading is pending              Thank you.  DO KELSIE Limon

## 2017-02-16 NOTE — PROGRESS NOTES
Please contact the patient and notify him of the following:  The blood sugar was elevated at 169.  Cholesterol is well controlled with an LDL of 75  Urine sample was normal.  The ultrasound of the abdominal aorta was normal.          Thank you.  DO KELSIE Limon

## 2017-03-03 ENCOUNTER — ANTICOAGULATION THERAPY VISIT (OUTPATIENT)
Dept: ANTICOAGULATION | Facility: OTHER | Age: 72
End: 2017-03-03
Payer: COMMERCIAL

## 2017-03-03 DIAGNOSIS — I48.0 PAROXYSMAL ATRIAL FIBRILLATION (H): ICD-10-CM

## 2017-03-03 DIAGNOSIS — Z79.01 LONG-TERM (CURRENT) USE OF ANTICOAGULANTS: ICD-10-CM

## 2017-03-03 DIAGNOSIS — I48.19 PERSISTENT ATRIAL FIBRILLATION (H): ICD-10-CM

## 2017-03-03 LAB — INR POINT OF CARE: 2.4 (ref 0.86–1.14)

## 2017-03-03 PROCEDURE — 99207 ZZC NO CHARGE NURSE ONLY: CPT

## 2017-03-03 PROCEDURE — 36416 COLLJ CAPILLARY BLOOD SPEC: CPT

## 2017-03-03 PROCEDURE — 85610 PROTHROMBIN TIME: CPT | Mod: QW

## 2017-03-03 NOTE — MR AVS SNAPSHOT
Brodie Ayala   3/3/2017 1:15 PM   Anticoagulation Therapy Visit    Description:  71 year old male   Provider:  [facility]   Department:  [facility]           INR as of 3/3/2017     Today's INR 2.4      Anticoagulation Summary as of 3/3/2017     INR goal 2.0-3.0   Today's INR 2.4   Full instructions 7.5 mg on Tue, Thu, Sat; 10 mg all other days   Next INR check 4/14/2017    Indications   Long-term (current) use of anticoagulants [Z79.01] [Z79.01]  Persistent atrial fibrillation (H) [I48.1]  Paroxysmal atrial fibrillation (H) [I48.0]         Your next Anticoagulation Clinic appointment(s)     Apr 14, 2017 10:30 AM CDT   Anticoagulation Visit with [facility] ANTI COAG   [facility]    [address] [facility] [address]   [phone]              Contact Numbers     Clinic Number:         March 2017 Details    Sun Mon Tue Wed Thu Fri Sat        1               2               3      10 mg   See details      4      7.5 mg           5      10 mg         6      10 mg         7      7.5 mg         8      10 mg         9      7.5 mg         10      10 mg         11      7.5 mg           12      10 mg         13      10 mg         14      7.5 mg         15      10 mg         16      7.5 mg         17      10 mg         18      7.5 mg           19      10 mg         20      10 mg         21      7.5 mg         22      10 mg         23      7.5 mg         24      10 mg         25      7.5 mg           26      10 mg         27      10 mg         28      7.5 mg         29      10 mg         30      7.5 mg         31      10 mg           Date Details   03/03 This INR check               How to take your warfarin dose     To take:  7.5 mg Take 1.5 of the 5 mg tablets.    To take:  10 mg Take 2 of the 5 mg tablets.           April 2017 Details    Sun Mon Tue Wed Thu Fri Sat           1      7.5 mg           2      10 mg         3      10 mg         4      7.5 mg         5      10 mg          6      7.5 mg         7      10 mg         8      7.5 mg           9      10 mg         10      10 mg         11      7.5 mg         12      10 mg         13      7.5 mg         14            15                 16               17               18               19               20               21               22                 23               24               25               26               27               28               29                 30                      Date Details   No additional details    Date of next INR:  4/14/2017         How to take your warfarin dose     To take:  7.5 mg Take 1.5 of the 5 mg tablets.    To take:  10 mg Take 2 of the 5 mg tablets.

## 2017-03-03 NOTE — PROGRESS NOTES
ANTICOAGULATION FOLLOW-UP CLINIC VISIT    Patient Name:  Brodie Cai  Date:  3/3/2017  Contact Type:  Face to Face    SUBJECTIVE:     Patient Findings     Positives No Problem Findings           OBJECTIVE    INR Protime   Date Value Ref Range Status   03/03/2017 2.4 (A) 0.86 - 1.14 Final       ASSESSMENT / PLAN  INR assessment THER    Recheck INR In: 6 WEEKS    INR Location Clinic      Anticoagulation Summary as of 3/3/2017     INR goal 2.0-3.0   Today's INR 2.4   Maintenance plan 7.5 mg (5 mg x 1.5) on Tue, Thu, Sat; 10 mg (5 mg x 2) all other days   Full instructions 7.5 mg on Tue, Thu, Sat; 10 mg all other days   Weekly total 62.5 mg   No change documented Elizabeth Alcantara RN   Plan last modified Elizabeth Alcantara RN (3/25/2016)   Next INR check 4/14/2017   Target end date     Indications   Long-term (current) use of anticoagulants [Z79.01] [Z79.01]  Persistent atrial fibrillation (H) [I48.1]  Paroxysmal atrial fibrillation (H) [I48.0]         Anticoagulation Episode Summary     INR check location     Preferred lab     Send INR reminders to [facility]    Comments       Anticoagulation Care Providers     Provider Role Specialty Phone number    Joshua Arizmendi DO [facility] [phone]            See the Encounter Report to view Anticoagulation Flowsheet and Dosing Calendar (Go to Encounters tab in chart review, and find the Anticoagulation Therapy Visit)    Dosage adjustment made based on physician directed care plan.    Elizabeth Alcantara RN

## 2017-03-13 LAB
DLCOUNC-%PRED-PRE: 91 %
DLCOUNC-PRE: 23.34 ML/MIN/MMHG
DLCOUNC-PRED: 25.6 ML/MIN/MMHG
ERV-%PRED-PRE: 145 %
ERV-PRE: 0.46 L
ERV-PRED: 0.32 L
EXPTIME-PRE: 7.08 SEC
FEF2575-%PRED-POST: 86 %
FEF2575-%PRED-PRE: 52 %
FEF2575-POST: 2.05 L/SEC
FEF2575-PRE: 1.24 L/SEC
FEF2575-PRED: 2.37 L/SEC
FEFMAX-%PRED-PRE: 81 %
FEFMAX-PRE: 6.56 L/SEC
FEFMAX-PRED: 8.08 L/SEC
FEV1-%PRED-PRE: 62 %
FEV1-PRE: 1.94 L
FEV1FEV6-PRE: 72 %
FEV1FEV6-PRED: 78 %
FEV1FVC-PRE: 71 %
FEV1FVC-PRED: 76 %
FEV1SVC-PRE: 63 %
FEV1SVC-PRED: 68 %
FIFMAX-PRE: 1.98 L/SEC
FRCPLETH-%PRED-PRE: 108 %
FRCPLETH-PRE: 3.95 L
FRCPLETH-PRED: 3.65 L
FVC-%PRED-PRE: 66 %
FVC-PRE: 2.71 L
FVC-PRED: 4.09 L
GAW-%PRED-PRE: 90 %
GAW-PRE: 0.94 L/S/CMH2O
GAW-PRED: 1.03 L/S/CMH2O
IC-%PRED-PRE: 61 %
IC-PRE: 2.62 L
IC-PRED: 4.25 L
RVPLETH-%PRED-PRE: 132 %
RVPLETH-PRE: 3.48 L
RVPLETH-PRED: 2.62 L
SGAW-%PRED-PRE: 237 %
SGAW-PRE: 0.19 1/CMH2O*S
SGAW-PRED: 0.08 1/CMH2O*S
SRAW-%PRED-PRE: 123 %
SRAW-PRE: 5.9 CMH2O*S
SRAW-PRED: 4.76 CMH2O*S
TLCPLETH-%PRED-PRE: 94 %
TLCPLETH-PRE: 6.57 L
TLCPLETH-PRED: 6.92 L
VA-%PRED-PRE: 77 %
VA-PRE: 5.14 L
VC-%PRED-PRE: 67 %
VC-PRE: 3.09 L
VC-PRED: 4.57 L

## 2017-04-03 ENCOUNTER — TRANSFERRED RECORDS (OUTPATIENT)
Dept: HEALTH INFORMATION MANAGEMENT | Facility: CLINIC | Age: 72
End: 2017-04-03

## 2017-04-11 ENCOUNTER — OFFICE VISIT (OUTPATIENT)
Dept: FAMILY MEDICINE | Facility: OTHER | Age: 72
End: 2017-04-11
Payer: COMMERCIAL

## 2017-04-11 VITALS
DIASTOLIC BLOOD PRESSURE: 82 MMHG | OXYGEN SATURATION: 93 % | WEIGHT: 284 LBS | HEART RATE: 88 BPM | RESPIRATION RATE: 16 BRPM | TEMPERATURE: 98.1 F | BODY MASS INDEX: 41.94 KG/M2 | SYSTOLIC BLOOD PRESSURE: 120 MMHG

## 2017-04-11 DIAGNOSIS — I10 ESSENTIAL HYPERTENSION WITH GOAL BLOOD PRESSURE LESS THAN 140/90: ICD-10-CM

## 2017-04-11 DIAGNOSIS — R53.83 FATIGUE, UNSPECIFIED TYPE: Primary | ICD-10-CM

## 2017-04-11 DIAGNOSIS — E11.65 TYPE 2 DIABETES MELLITUS WITH HYPERGLYCEMIA, WITHOUT LONG-TERM CURRENT USE OF INSULIN (H): ICD-10-CM

## 2017-04-11 DIAGNOSIS — E78.5 HYPERLIPIDEMIA WITH TARGET LDL LESS THAN 100: ICD-10-CM

## 2017-04-11 DIAGNOSIS — I10 HYPERTENSION GOAL BP (BLOOD PRESSURE) < 140/90: ICD-10-CM

## 2017-04-11 LAB
HBA1C MFR BLD: 7.4 % (ref 4.3–6)
TSH SERPL DL<=0.005 MIU/L-ACNC: 3.75 MU/L (ref 0.4–4)

## 2017-04-11 PROCEDURE — 99207 C FOOT EXAM  NO CHARGE: CPT | Performed by: INTERNAL MEDICINE

## 2017-04-11 PROCEDURE — 36415 COLL VENOUS BLD VENIPUNCTURE: CPT | Performed by: INTERNAL MEDICINE

## 2017-04-11 PROCEDURE — 99214 OFFICE O/P EST MOD 30 MIN: CPT | Performed by: INTERNAL MEDICINE

## 2017-04-11 PROCEDURE — 84443 ASSAY THYROID STIM HORMONE: CPT | Performed by: INTERNAL MEDICINE

## 2017-04-11 PROCEDURE — 83036 HEMOGLOBIN GLYCOSYLATED A1C: CPT | Performed by: INTERNAL MEDICINE

## 2017-04-11 RX ORDER — LISINOPRIL 40 MG/1
40 TABLET ORAL DAILY
Qty: 90 TABLET | Refills: 3 | Status: SHIPPED | OUTPATIENT
Start: 2017-04-11 | End: 2018-05-23

## 2017-04-11 RX ORDER — METOPROLOL TARTRATE 50 MG
TABLET ORAL
Qty: 180 TABLET | Refills: 1 | Status: SHIPPED | OUTPATIENT
Start: 2017-04-11

## 2017-04-11 RX ORDER — GLIPIZIDE 10 MG/1
10 TABLET ORAL
Qty: 180 TABLET | Refills: 3 | Status: SHIPPED | OUTPATIENT
Start: 2017-04-11

## 2017-04-11 RX ORDER — AMLODIPINE BESYLATE 10 MG/1
10 TABLET ORAL DAILY
Qty: 90 TABLET | Refills: 3 | Status: SHIPPED | OUTPATIENT
Start: 2017-04-11 | End: 2018-05-23

## 2017-04-11 RX ORDER — FUROSEMIDE 20 MG
20 TABLET ORAL DAILY
Qty: 90 TABLET | Refills: 3 | Status: SHIPPED | OUTPATIENT
Start: 2017-04-11 | End: 2018-05-23

## 2017-04-11 RX ORDER — TAMSULOSIN HYDROCHLORIDE 0.4 MG/1
0.4 CAPSULE ORAL DAILY
Qty: 90 CAPSULE | Refills: 3 | Status: SHIPPED | OUTPATIENT
Start: 2017-04-11 | End: 2018-05-23

## 2017-04-11 RX ORDER — LOVASTATIN 40 MG
40 TABLET ORAL 2 TIMES DAILY
Qty: 180 TABLET | Refills: 3 | Status: SHIPPED | OUTPATIENT
Start: 2017-04-11 | End: 2018-08-28

## 2017-04-11 ASSESSMENT — PAIN SCALES - GENERAL: PAINLEVEL: NO PAIN (0)

## 2017-04-11 NOTE — MR AVS SNAPSHOT
After Visit Summary   4/11/2017    Brodie Cai    MRN: [id number]           Patient Information     Date Of Birth          1945        Visit Information        Provider Department      4/11/2017 10:00 AM Joshua Arizmendi DO [facility]        Today's Diagnoses     Fatigue, unspecified type    -  1    Essential hypertension with goal blood pressure less than 140/90        Hyperlipidemia with target LDL less than 100        Type 2 diabetes mellitus with hyperglycemia, without long-term current use of insulin (H)        Hypertension goal BP (blood pressure) < 140/90          Care Instructions    Component Value Flag Ref Range Units Status Collected Lab   Cholesterol 141  <200 mg/dL Final 02/01/2017 10:45 AM [facility]   Triglycerides 140  <150 mg/dL Final 02/01/2017 10:45 AM [facility]   HDL Cholesterol 38 (L) >39 mg/dL Final 02/01/2017 10:45 AM [facility]   LDL Cholesterol Calculated 75  <100 mg/dL Final 02/01/2017 10:45 AM [facility]   Comment:   Desirable:       <100 mg/dl   Non HDL Cholesterol 103  <130 mg/dL Final 02/01/2017 10:45 AM [facility]     Component Value Flag Ref Range Units Status Collected Lab   Sodium 145 (H) 133 - 144 mmol/L Final 02/01/2017 10:45 AM [facility]   Potassium 3.8  3.4 - 5.3 mmol/L Final 02/01/2017 10:45 AM [facility]   Chloride 106  94 - 109 mmol/L Final 02/01/2017 10:45 AM [facility]   Carbon Dioxide 32  20 - 32 mmol/L Final 02/01/2017 10:45 AM [facility]   Anion Gap 7  3 - 14 mmol/L Final 02/01/2017 10:45 AM [facility]   Glucose 169 (H) 70 - 99 mg/dL Final 02/01/2017 10:45 AM [facility]   Urea Nitrogen 16  7 - 30 mg/dL Final 02/01/2017 10:45 AM [facility]   Creatinine 0.87  0.66 - 1.25 mg/dL Final 02/01/2017 10:45 AM [facility]   GFR Estimate 87  >60 mL/min/1.7m2 Final 02/01/2017 10:45 AM [facility]   Comment:   Non  GFR Calc   GFR Estimate If Black   >60 mL/min/1.7m2 Final 02/01/2017 10:45 AM [facility]   >90    GFR Calc      Calcium 9.5  8.5 - 10.1  mg/dL Final 02/01/2017 10:45 AM [facility]             Follow-ups after your visit        Additional Services     DIABETES EDUCATOR REFERRAL       DIABETES SELF MANAGEMENT TRAINING (DSMT)      Your provider has referred you to Diabetes Education: FM[facility]: Diabetes Education - All [facility] [phone]   [email or web address]    Type of training and number of hours: Previous Diagnosis: Follow-up DSMT - 2 hours.    Medicare covers: 10 hours of initial DSMT in 12 month period from the time of first visit, plus 2 hours of follow-up DSMT annually, and additional hours as requested for insulin training.    Diabetes Type: Type 2 - On Oral Medication             Diabetes Co-Morbidities: atherosclerotic cardiovascular disease               A1C Goal:  <8.0       A1C is: Lab Results       Component                Value               Date                       A1C                      8.0                 09/27/2016              If an urgent visit is needed or A1C is above 12, Care Team to call the Diabetes Education Team at [phone] or send an In Basket message to the Diabetes Education Pool (P DIAB ED-PATIENT CARE).    Diabetes Education Topics: Comprehensive Knowledge Assessment and Instruction    Special Educational Needs Requiring Individual DSMT: None       MEDICAL NUTRITION THERAPY (MNT) for Diabetes  : YES    Medical Nutrition Therapy with a Registered Dietitian can be provided in coordination with Diabetes Self-Management Training to assist in achieving optimal diabetes management.    MNT Type and Hours:                        Medicare will cover: 3 hours initial MNT in 12 month period after first visit, plus 2 hours of follow-up MNT annually    Please be aware that coverage of these services is subject to the terms and limitations of your health insurance plan.  Call member services at your health plan to determine Diabetes Self-Management Training benefits and ask  "which blood glucose monitor brands are covered by your plan.      Please bring the following with you to your appointment:    (1)  List of current medications   (2)  List of Blood Glucose Monitor brands that are covered by your insurance plan  (3)  Blood Glucose Monitor and log book  (4)   Food records for the 3 days prior to your visit    The Certified Diabetes Educator may make diabetes medication adjustments per the CDE Protocol and Collaborative Practice Agreement.                  Your next 10 appointments already scheduled     Apr 14, 2017 10:30 AM CDT   Anticoagulation Visit with  ANTI COAG   [facility] ([facility]    [address] [facility] [id number] [phone]              Who to contact     If you have questions or need follow up information about today's clinic visit or your schedule please contact [facility] directly at [phone].  Normal or non-critical lab and imaging results will be communicated to you by [employer], letter or phone within 4 business days after the clinic has received the results. If you do not hear from us within 7 days, please contact the clinic through MyChart or phone. If you have a critical or abnormal lab result, we will notify you by phone as soon as possible.  Submit refill requests through [facility] or call your pharmacy and they will forward the refill request to us. Please allow 3 business days for your refill to be completed.          Additional Information About Your Visit        [employer] Information     [facility] lets you send messages to your doctor, view your test results, renew your prescriptions, schedule appointments and more. To sign up, go to [email or web address] . Click on \"Log in\" on the left side of the screen, which will take you to the Welcome page. Then click on \"Sign up Now\" on the right side of the page.     You will be asked to enter the access code listed below, as well as some personal information. Please " follow the directions to create your username and password.     Your access code is: [email or web address]  Expires: 2017 11:36 AM     Your access code will  in 90 days. If you need help or a new code, please call your [place] clinic or [phone].        Care EveryWhere ID     This is your Care EveryWhere ID. This could be used by other organizations to access your [place] medical records  [id number]        Your Vitals Were     Pulse Temperature Respirations Pulse Oximetry BMI (Body Mass Index)       88 98.1  F (36.7  C) (Tympanic) 16 93% 41.94 kg/m2        Blood Pressure from Last 3 Encounters:   17 120/82   17 132/82   16 (!) 136/92    Weight from Last 3 Encounters:   17 284 lb (128.8 kg)   17 282 lb (127.9 kg)   16 298 lb (135.2 kg)              We Performed the Following     DIABETES EDUCATOR REFERRAL     FOOT EXAM     Hemoglobin A1c     TSH with free T4 reflex          Where to get your medicines      These medications were sent to [facility] - [place], [place] - [address]  [address], [place] [address]     Phone:  [phone]     amLODIPine 10 MG tablet    furosemide 20 MG tablet    glipiZIDE 10 MG tablet    lisinopril 40 MG tablet    lovastatin 40 MG tablet    metFORMIN 1000 MG tablet    metoprolol 50 MG tablet    sitagliptin 100 MG tablet    tamsulosin 0.4 MG capsule          Primary Care Provider Office Phone # Fax #    Joshua Tyler Arizmendi [phone]       [address] [place] [address]        Thank you!     Thank you for choosing [facility]  for your care. Our goal is always to provide you with excellent care. Hearing back from our patients is one way we can continue to improve our services. Please take a few minutes to complete the written survey that you may receive in the mail after your visit with us. Thank you!             Your Updated Medication List -  Protect others around you: Learn how to safely use, store and throw away your medicines at www.disposemymeds.org.          This list is accurate as of: 4/11/17  5:12 PM.  Always use your most recent med list.                   Brand Name Dispense Instructions for use    Alcohol Wipes 70 % Pads     300 packet    Wipe area before testing.       ALEVE 220 MG tablet   Generic drug:  naproxen sodium      Take 220 mg by mouth 2 times daily (with meals) Take 2 tabs       amLODIPine 10 MG tablet    NORVASC    90 tablet    Take 1 tablet (10 mg) by mouth daily       blood glucose calibration solution     1 Bottle    Use to calibrate blood glucose monitor as needed as directed.       blood glucose monitoring test strip    ACCU-CHEK AURELIO PLUS    1 Box    USE  TO  TEST  BLOOD  SUGAR THREE TIMES DAILY  OR AS DIRECTED       cloNIDine 0.2 MG tablet    CATAPRES    180 tablet    Take 1 tablet (0.2 mg) by mouth 2 times daily       fluticasone-salmeterol 250-50 MCG/DOSE diskus inhaler    ADVAIR    3 Inhaler    Inhale 1 puff into the lungs 2 times daily       furosemide 20 MG tablet    LASIX    90 tablet    Take 1 tablet (20 mg) by mouth daily       glipiZIDE 10 MG tablet    GLUCOTROL    180 tablet    Take 1 tablet (10 mg) by mouth 2 times daily (before meals)       insulin glargine 100 UNIT/ML injection    LANTUS SOLOSTAR    3 Month    50 units ONCE daily       insulin syringes (disposable) U-100 1 ML Misc     3 Box    1 Device 2 times daily       lisinopril 40 MG tablet    PRINIVIL/ZESTRIL    90 tablet    Take 1 tablet (40 mg) by mouth daily       lovastatin 40 MG tablet    MEVACOR    180 tablet    Take 1 tablet (40 mg) by mouth 2 times daily       metFORMIN 1000 MG tablet    GLUCOPHAGE    180 tablet    TAKE 1 TABLET TWICE DAILY WITH MEALS       metoprolol 50 MG tablet    LOPRESSOR    180 tablet    TAKE 1 AND 1/2 TABLETS TWICE DAILY       * order for DME     1 Units    Equipment being ordered: Energizer Watch Battery 3V [id number]        order for DME     1 Device    Equipment being ordered: CPAP       * order for DME     1 Device    Equipment being ordered: CPAP machine and supplies       sildenafil 100 MG cap/tab    REVATIO/VIAGRA    12 tablet    Take 0.5-1 tablets ( mg) by mouth daily as needed for erectile dysfunction Take 30 min to 4 hours before intercourse.  Never use with nitroglycerin, terazosin or doxazosin.       sitagliptin 100 MG tablet    JANUVIA    90 tablet    Take 1 tablet (100 mg) by mouth daily       tamsulosin 0.4 MG capsule    FLOMAX    90 capsule    Take 1 capsule (0.4 mg) by mouth daily       VITAMIN D3 PO      Take 1,000 Units by mouth daily       warfarin 5 MG tablet    COUMADIN    150 tablet    Take  7.5 mg (1.5 tablets) on Tuesday, Thursday, Saturday and 10 mg (2 tabs) the rest of the week or as directed by the coumadin clinic.       * Notice:  This list has 2 medication(s) that are the same as other medications prescribed for you. Read the directions carefully, and ask your doctor or other care provider to review them with you.

## 2017-04-11 NOTE — PROGRESS NOTES
SUBJECTIVE:                                                    Brodie Cai is a 71 year old male who presents to clinic today for the following health issues:      Chief Complaint   Patient presents with     Results     back in 2/2017       CHIEF COMPLAINT:    The patient is a pleasant 71-year-old gentleman who presents to review his laboratory work from February. Notably, he received verbal report by once copies so that he can compare them to previous values. His blood sugar was elevated at 169, his lipid panel was quite acceptable. He does have a history of type 2 diabetes and controls this with oral medications. He's had no significant hyper or hypoglycemic episodes. His concurrent hypertension as well as hyperlipidemia, these have been also well controlled. Medications have not caused any significant side effects for him. He has a history of paroxysmal atrial fibrillation and is on anticoagulation for this. He notes that he's had no syncope or near syncope. He has had some fatigue, he questions the origin of this. He's had no recent upper respiratory symptoms or other concerns. He does have a history of some mild reactive airway disease and gets good results with Advair for suppression.                         PAST, FAMILY,SOCIAL HISTORY:     Medical  History:   has no past medical history on file.     Surgical History:   has no past surgical history on file.     Social History:   reports that he has quit smoking. He quit smokeless tobacco use about 31 years ago. He reports that he drinks alcohol. He reports that he does not use illicit drugs.     Family History:  family history is not on file.            MEDICATIONS  Current Outpatient Prescriptions   Medication Sig Dispense Refill     metoprolol (LOPRESSOR) 50 MG tablet TAKE 1 AND 1/2 TABLETS TWICE DAILY 180 tablet 1     lovastatin (MEVACOR) 40 MG tablet Take 1 tablet (40 mg) by mouth 2 times daily 180 tablet 3     metFORMIN (GLUCOPHAGE) 1000 MG tablet  TAKE 1 TABLET TWICE DAILY WITH MEALS 180 tablet 3     sitagliptin (JANUVIA) 100 MG tablet Take 1 tablet (100 mg) by mouth daily 90 tablet 3     furosemide (LASIX) 20 MG tablet Take 1 tablet (20 mg) by mouth daily 90 tablet 3     glipiZIDE (GLUCOTROL) 10 MG tablet Take 1 tablet (10 mg) by mouth 2 times daily (before meals) 180 tablet 3     tamsulosin (FLOMAX) 0.4 MG capsule Take 1 capsule (0.4 mg) by mouth daily 90 capsule 3     amLODIPine (NORVASC) 10 MG tablet Take 1 tablet (10 mg) by mouth daily 90 tablet 3     lisinopril (PRINIVIL/ZESTRIL) 40 MG tablet Take 1 tablet (40 mg) by mouth daily 90 tablet 3     cloNIDine (CATAPRES) 0.2 MG tablet Take 1 tablet (0.2 mg) by mouth 2 times daily 180 tablet 0     fluticasone-salmeterol (ADVAIR) 250-50 MCG/DOSE diskus inhaler Inhale 1 puff into the lungs 2 times daily 3 Inhaler 1     sildenafil (REVATIO/VIAGRA) 100 MG cap/tab Take 0.5-1 tablets ( mg) by mouth daily as needed for erectile dysfunction Take 30 min to 4 hours before intercourse.  Never use with nitroglycerin, terazosin or doxazosin. 12 tablet 11     warfarin (COUMADIN) 5 MG tablet Take  7.5 mg (1.5 tablets) on Tuesday, Thursday, Saturday and 10 mg (2 tabs) the rest of the week or as directed by the coumadin clinic. 150 tablet 3     order for DME Equipment being ordered: CPAP machine and supplies 1 Device 0     insulin glargine (LANTUS SOLOSTAR) 100 UNIT/ML PEN 50 units ONCE daily 3 Month 1     order for DME Equipment being ordered: CPAP 1 Device 1     blood glucose monitoring (ACCU-CHEK AURELIO PLUS) test strip USE  TO  TEST  BLOOD  SUGAR THREE TIMES DAILY  OR AS DIRECTED 1 Box 11     insulin syringes, disposable, U-100 1 ML MISC 1 Device 2 times daily 3 Box 5     Cholecalciferol (VITAMIN D3 PO) Take 1,000 Units by mouth daily       naproxen sodium (ALEVE) 220 MG tablet Take 220 mg by mouth 2 times daily (with meals) Take 2 tabs       Alcohol Swabs (ALCOHOL WIPES) 70 % PADS Wipe area before testing. 300 packet  PRN     blood glucose calibration (ACCU-CHEK AURELIO) solution Use to calibrate blood glucose monitor as needed as directed. 1 Bottle PRN     ORDER FOR DME Equipment being ordered: Energizer Watch Battery 3V [id number] 1 Units PRN     [DISCONTINUED] metoprolol (LOPRESSOR) 50 MG tablet TAKE 1 AND 1/2 TABLETS TWICE DAILY 180 tablet 1     [DISCONTINUED] lovastatin (MEVACOR) 40 MG tablet Take 1 tablet (40 mg) by mouth 2 times daily 180 tablet 3     [DISCONTINUED] metFORMIN (GLUCOPHAGE) 1000 MG tablet TAKE 1 TABLET TWICE DAILY WITH MEALS 180 tablet 3     [DISCONTINUED] sitagliptin (JANUVIA) 100 MG tablet Take 1 tablet (100 mg) by mouth daily 90 tablet 3     [DISCONTINUED] furosemide (LASIX) 20 MG tablet Take 1 tablet (20 mg) by mouth daily 90 tablet 3     [DISCONTINUED] glipiZIDE (GLUCOTROL) 10 MG tablet Take 1 tablet (10 mg) by mouth 2 times daily (before meals) 180 tablet 3     [DISCONTINUED] amLODIPine (NORVASC) 10 MG tablet Take 1 tablet (10 mg) by mouth daily 90 tablet 3     [DISCONTINUED] lisinopril (PRINIVIL,ZESTRIL) 40 MG tablet Take 1 tablet (40 mg) by mouth daily 90 tablet 3         --------------------------------------------------------------------------------------------------------------------                          REVIEW OF SYSTEMS:         LUNGS: Pt denies: cough,excess sputum, hemoptysis, or shortness of breath.   HEART: Pt denies: chest pain, arrythmia, syncope, tachy or bradyarrhythmia or excess edema.   GI: Pt denies: nausea, vomitting, diarrhea, constipation, melena, or hematochezia.   NEURO: Pt denies: seizures, strokes, diplopia, weakness, paraesthesias, or paralysis.   SKIN: Pt denies: itching, rashes, discoloration, or specific lesions of concern. Denies recent hair loss.                          EXAMINATION:         /82  Pulse 88  Temp 98.1  F (36.7  C) (Tympanic)  Resp 16  Wt 284 lb (128.8 kg)  SpO2 93%  BMI 41.94 kg/m2   Constitutional: The patient appears to be in no acute distress.  The patient appears to be adequately hydrated. No acute respiratory or hemodynamic distress is noted at this time.   LUNGS: clear bilaterally, airflow is brisk, no intercostal retraction or stridor is noted. No coughing is noted during visit.   HEART:  regular without rubs, clicks, gallops, or murmurs. PMI is nondisplaced. Upstrokes are brisk. S1,S2 are heard.   GI: Abdomen is soft, without rebound, guarding or tenderness. Bowel sounds are appropriate. No renal bruits are heard. Abdomen is morbidly obese   NEURO: Pt is alert and appropriate. No neurologic lateralization is noted. Cranial nerves 2-12 are intact. Peripheral sensory and motor function are grossly normal   SKIN:  warm and dry. No erythema, or rashes are noted. No specific lesions of concern are noted.      Feet: Examination of the feet demonstrates no ulceration, skin breakdown or ischemic changes. Sensation is slightly decreased to both vibration and monofilament. Capillary refill is brisk in the toes. No significant skin changes are noted.                        DECISION MAKIN. Essential hypertension with goal blood pressure less than 140/90  Continue current medications  - metoprolol (LOPRESSOR) 50 MG tablet; TAKE 1 AND 1/2 TABLETS TWICE DAILY  Dispense: 180 tablet; Refill: 1    2. Hyperlipidemia with target LDL less than 100  Continue statin therapy  - lovastatin (MEVACOR) 40 MG tablet; Take 1 tablet (40 mg) by mouth 2 times daily  Dispense: 180 tablet; Refill: 3    3. Type 2 diabetes mellitus with hyperglycemia, without long-term current use of insulin (H)  Continue current medications and adjust as needed  - metFORMIN (GLUCOPHAGE) 1000 MG tablet; TAKE 1 TABLET TWICE DAILY WITH MEALS  Dispense: 180 tablet; Refill: 3  - sitagliptin (JANUVIA) 100 MG tablet; Take 1 tablet (100 mg) by mouth daily  Dispense: 90 tablet; Refill: 3  - glipiZIDE (GLUCOTROL) 10 MG tablet; Take 1 tablet (10 mg) by mouth 2 times daily (before meals)  Dispense: 180  tablet; Refill: 3  - Hemoglobin A1c  - FOOT EXAM  - DIABETES EDUCATOR REFERRAL  specifically regarding dietary restriction and weight loss    4. Hypertension goal BP (blood pressure) < 140/90  Continue current medication and adjust as needed  - furosemide (LASIX) 20 MG tablet; Take 1 tablet (20 mg) by mouth daily  Dispense: 90 tablet; Refill: 3  - tamsulosin (FLOMAX) 0.4 MG capsule; Take 1 capsule (0.4 mg) by mouth daily  Dispense: 90 capsule; Refill: 3  - amLODIPine (NORVASC) 10 MG tablet; Take 1 tablet (10 mg) by mouth daily  Dispense: 90 tablet; Refill: 3  - lisinopril (PRINIVIL/ZESTRIL) 40 MG tablet; Take 1 tablet (40 mg) by mouth daily  Dispense: 90 tablet; Refill: 3    5. Fatigue, unspecified type  Check thyroid for causes of fatigue  - TSH with free T4 reflex                               FOLLOW UP    I have asked the patient to make an appointment for follow up with me in 1 month or as needed depending upon lab results        I have carefully explained the diagnosis and treatment options with the patient. The patient has displayed an understanding of the above, and all subsequent questions were answered.         DO KELSIE Limon    Portions of this note were produced using [employer]  Although every attempt at real-time proof reading has been made, occasional grammar/syntax errors may have been missed.

## 2017-04-11 NOTE — NURSING NOTE
"Chief Complaint   Patient presents with     Results     back in 2/2017       Initial /82  Pulse 88  Temp 98.1  F (36.7  C) (Tympanic)  Resp 16  Wt 284 lb (128.8 kg)  SpO2 93%  BMI 41.94 kg/m2 Estimated body mass index is 41.94 kg/(m^2) as calculated from the following:    Height as of 2/1/17: 5' 9\" (1.753 m).    Weight as of this encounter: 284 lb (128.8 kg).  Medication Reconciliation: complete    "

## 2017-04-11 NOTE — PATIENT INSTRUCTIONS
Component Value Flag Ref Range Units Status Collected Lab   Cholesterol 141  <200 mg/dL Final 02/01/2017 10:45 AM [facility]   Triglycerides 140  <150 mg/dL Final 02/01/2017 10:45 AM [facility]   HDL Cholesterol 38 (L) >39 mg/dL Final 02/01/2017 10:45 AM [facility]   LDL Cholesterol Calculated 75  <100 mg/dL Final 02/01/2017 10:45 AM [facility]   Comment:   Desirable:       <100 mg/dl   Non HDL Cholesterol 103  <130 mg/dL Final 02/01/2017 10:45 AM [facility]     Component Value Flag Ref Range Units Status Collected Lab   Sodium 145 (H) 133 - 144 mmol/L Final 02/01/2017 10:45 AM [facility]   Potassium 3.8  3.4 - 5.3 mmol/L Final 02/01/2017 10:45 AM [facility]   Chloride 106  94 - 109 mmol/L Final 02/01/2017 10:45 AM [facility]   Carbon Dioxide 32  20 - 32 mmol/L Final 02/01/2017 10:45 AM [facility]   Anion Gap 7  3 - 14 mmol/L Final 02/01/2017 10:45 AM [facility]   Glucose 169 (H) 70 - 99 mg/dL Final 02/01/2017 10:45 AM [facility]   Urea Nitrogen 16  7 - 30 mg/dL Final 02/01/2017 10:45 AM [facility]   Creatinine 0.87  0.66 - 1.25 mg/dL Final 02/01/2017 10:45 AM [facility]   GFR Estimate 87  >60 mL/min/1.7m2 Final 02/01/2017 10:45 AM [facility]   Comment:   Non  GFR Calc   GFR Estimate If Black   >60 mL/min/1.7m2 Final 02/01/2017 10:45 AM [facility]   >90    GFR Calc      Calcium 9.5  8.5 - 10.1 mg/dL Final 02/01/2017 10:45 AM [facility]

## 2017-04-13 ENCOUNTER — TELEPHONE (OUTPATIENT)
Dept: INTERNAL MEDICINE | Facility: CLINIC | Age: 72
End: 2017-04-13

## 2017-04-13 NOTE — PROGRESS NOTES
Please contact the patient and notify him of the following:  The thyroid is well-adjusted.  The glycosylated hemoglobin is down from 8.0 down to 7.4.  This is consistent with improved blood sugar control.              Thank you.  DO KELSIE Limon

## 2017-04-13 NOTE — TELEPHONE ENCOUNTER
----- Message from Joshua Arizmendi DO sent at 4/12/2017 10:18 PM CDT -----    Please contact the patient and notify him of the following:  The thyroid is well-adjusted.  The glycosylated hemoglobin is down from 8.0 down to 7.4.  This is consistent with improved blood sugar control.              Thank you.  DO KELSIE Limon

## 2017-04-14 ENCOUNTER — ANTICOAGULATION THERAPY VISIT (OUTPATIENT)
Dept: ANTICOAGULATION | Facility: OTHER | Age: 72
End: 2017-04-14
Payer: COMMERCIAL

## 2017-04-14 DIAGNOSIS — Z79.01 LONG-TERM (CURRENT) USE OF ANTICOAGULANTS: ICD-10-CM

## 2017-04-14 DIAGNOSIS — I48.0 PAROXYSMAL ATRIAL FIBRILLATION (H): ICD-10-CM

## 2017-04-14 DIAGNOSIS — I48.19 PERSISTENT ATRIAL FIBRILLATION (H): ICD-10-CM

## 2017-04-14 LAB — INR POINT OF CARE: 1.7 (ref 0.86–1.14)

## 2017-04-14 PROCEDURE — 85610 PROTHROMBIN TIME: CPT | Mod: QW

## 2017-04-14 PROCEDURE — 99207 ZZC NO CHARGE NURSE ONLY: CPT

## 2017-04-14 PROCEDURE — 36416 COLLJ CAPILLARY BLOOD SPEC: CPT

## 2017-04-14 NOTE — TELEPHONE ENCOUNTER
Patient notified thyroid is well adjusted and glycosylated hemoglobin is down from 8.0 to 7.4. This is consistent with improved blood sugar control. Rach Hoskins LPN

## 2017-04-14 NOTE — MR AVS SNAPSHOT
Brodie Cai   4/14/2017 10:30 AM   Anticoagulation Therapy Visit    Description:  71 year old male   Provider:  [facility]   Department:  [facility]           INR as of 4/14/2017     Today's INR 1.7!      Anticoagulation Summary as of 4/14/2017     INR goal 2.0-3.0   Today's INR 1.7!   Full instructions 4/15: 10 mg; Otherwise 7.5 mg on Tue, Thu, Sat; 10 mg all other days   Next INR check 5/26/2017    Indications   Long-term (current) use of anticoagulants [Z79.01] [Z79.01]  Persistent atrial fibrillation (H) [I48.1]  Paroxysmal atrial fibrillation (H) [I48.0]         Your next Anticoagulation Clinic appointment(s)     May 26, 2017 10:30 AM CDT   [facility]    [address] [facility] [address]   [phone]              Contact Numbers     Clinic Number:         April 2017 Details    Sun Mon Tue Wed Thu Fri Sat           1                 2               3               4               5               6               7               8                 9               10               11               12               13               14      10 mg   See details      15      10 mg           16      10 mg         17      10 mg         18      7.5 mg         19      10 mg         20      7.5 mg         21      10 mg         22      7.5 mg           23      10 mg         24      10 mg         25      7.5 mg         26      10 mg         27      7.5 mg         28      10 mg         29      7.5 mg           30      10 mg                Date Details   04/14 This INR check               How to take your warfarin dose     To take:  7.5 mg Take 1.5 of the 5 mg tablets.    To take:  10 mg Take 2 of the 5 mg tablets.           May 2017 Details    Sun Mon Tue Wed Thu Fri Sat      1      10 mg         2      7.5 mg         3      10 mg         4      7.5 mg         5      10 mg         6      7.5 mg           7      10 mg         8       10 mg         9      7.5 mg         10      10 mg         11      7.5 mg         12      10 mg         13      7.5 mg           14      10 mg         15      10 mg         16      7.5 mg         17      10 mg         18      7.5 mg         19      10 mg         20      7.5 mg           21      10 mg         22      10 mg         23      7.5 mg         24      10 mg         25      7.5 mg         26            27                 28               29               30               31                   Date Details   No additional details    Date of next INR:  5/26/2017         How to take your warfarin dose     To take:  7.5 mg Take 1.5 of the 5 mg tablets.    To take:  10 mg Take 2 of the 5 mg tablets.

## 2017-04-14 NOTE — PROGRESS NOTES
"  ANTICOAGULATION FOLLOW-UP CLINIC VISIT    Patient Name:  Brodie Cai  Date:  4/14/2017  Contact Type:  Face to Face    SUBJECTIVE:     Patient Findings     Positives Unexplained INR or factor level change    Comments Pt states he may have missed a dose or two but \"for the most part, I am pretty good\".            OBJECTIVE    INR Protime   Date Value Ref Range Status   04/14/2017 1.7 (A) 0.86 - 1.14 Final       ASSESSMENT / PLAN  INR assessment SUB    Recheck INR In: 6 WEEKS    INR Location Clinic      Anticoagulation Summary as of 4/14/2017     INR goal 2.0-3.0   Today's INR 1.7!   Maintenance plan 7.5 mg (5 mg x 1.5) on Tue, Thu, Sat; 10 mg (5 mg x 2) all other days   Full instructions 4/15: 10 mg; Otherwise 7.5 mg on Tue, Thu, Sat; 10 mg all other days   Weekly total 62.5 mg   Plan last modified Elizabeth Alcantara RN (3/25/2016)   Next INR check 5/26/2017   Target end date     Indications   Long-term (current) use of anticoagulants [Z79.01] [Z79.01]  Persistent atrial fibrillation (H) [I48.1]  Paroxysmal atrial fibrillation (H) [I48.0]         Anticoagulation Episode Summary     INR check location     Preferred lab     Send INR reminders to [facility]    Comments       Anticoagulation Care Providers     Provider Role Specialty Phone number    Joshua ArizmendiDO [facility] [phone]            See the Encounter Report to view Anticoagulation Flowsheet and Dosing Calendar (Go to Encounters tab in chart review, and find the Anticoagulation Therapy Visit)    Dosage adjustment made based on physician directed care plan.    Elizabeth Alcantara RN               "

## 2017-05-04 DIAGNOSIS — I10 HYPERTENSION GOAL BP (BLOOD PRESSURE) < 140/90: ICD-10-CM

## 2017-05-04 NOTE — TELEPHONE ENCOUNTER
Reason for Call:  Medication or medication refill:    Do you use a [facility]?  Name of the pharmacy and phone number for the current request:      Name of the medication requested: cloNIDine (CATAPRES) patch    Other request: patient states he was on the patch first, then switched to the pill, and would like to go back on the patch. Patient states that the pill makes him really sleepy, patient is aware that Dr. Arizmendi is out of the office until Monday 5/8 patient would like to know if another provider can approve this, please call and advise    Can we leave a detailed message on this number? YES    Phone number patient can be reached at: Cell number on file:    Telephone Information:   Mobile [phone]       Best Time: any    Call taken on 5/4/2017 at 12:02 PM by Mary Leblanc

## 2017-06-29 DIAGNOSIS — I48.19 PERSISTENT ATRIAL FIBRILLATION (H): ICD-10-CM

## 2017-06-29 RX ORDER — WARFARIN SODIUM 5 MG/1
TABLET ORAL
Qty: 20 TABLET | Refills: 0 | Status: SHIPPED | OUTPATIENT
Start: 2017-06-29 | End: 2017-12-30

## 2017-06-29 RX ORDER — WARFARIN SODIUM 5 MG/1
TABLET ORAL
Qty: 150 TABLET | Refills: 3 | Status: SHIPPED | OUTPATIENT
Start: 2017-06-29 | End: 2017-06-29

## 2017-06-29 NOTE — TELEPHONE ENCOUNTER
Reason for Call:  Medication or medication refill:    Do you use a [facility]?  Name of the pharmacy and phone number for the current request:  [facility] on [address]. Phone # [phone].     Name of the medication requested: Warfarin    Other request: Pt moved to [place] and has not seen his doc yet, so he's wondering if you can send a prescription down for him? He has appt with his doc next week. Please call and advise.     Can we leave a detailed message on this number? YES    Phone number patient can be reached at: Home number on file [phone] (home)    Best Time: anytime    Call taken on 6/29/2017 at 11:47 AM by Caro Boles

## 2017-07-13 ENCOUNTER — ANTICOAGULATION THERAPY VISIT (OUTPATIENT)
Dept: ANTICOAGULATION | Facility: OTHER | Age: 72
End: 2017-07-13

## 2017-07-13 DIAGNOSIS — Z79.01 LONG-TERM (CURRENT) USE OF ANTICOAGULANTS: ICD-10-CM

## 2017-07-13 DIAGNOSIS — I48.0 PAROXYSMAL ATRIAL FIBRILLATION (H): ICD-10-CM

## 2017-07-13 DIAGNOSIS — I48.19 PERSISTENT ATRIAL FIBRILLATION (H): ICD-10-CM

## 2017-12-30 DIAGNOSIS — I48.19 PERSISTENT ATRIAL FIBRILLATION (H): ICD-10-CM

## 2017-12-30 DIAGNOSIS — E11.65 TYPE 2 DIABETES MELLITUS WITH HYPERGLYCEMIA, WITHOUT LONG-TERM CURRENT USE OF INSULIN (H): ICD-10-CM

## 2018-01-02 RX ORDER — WARFARIN SODIUM 5 MG/1
TABLET ORAL
Qty: 162 TABLET | Refills: 1 | Status: SHIPPED | OUTPATIENT
Start: 2018-01-02 | End: 2018-05-23

## 2018-01-02 RX ORDER — SITAGLIPTIN 100 MG/1
TABLET, FILM COATED ORAL
Qty: 90 TABLET | Refills: 0 | Status: SHIPPED | OUTPATIENT
Start: 2018-01-02 | End: 2018-03-06

## 2018-01-02 NOTE — TELEPHONE ENCOUNTER
Requested Prescriptions   Pending Prescriptions Disp Refills     JANUVIA 100 MG tablet [Pharmacy Med Name: JANUVIA 100 MG Tablet] 90 tablet 1     Sig: TAKE 1 TABLET DAILY    DPP4 Inhibitors Protocol Failed    12/30/2017  7:42 PM       Failed - Blood pressure less than 140/90 in past 6 months    BP Readings from Last 3 Encounters:   04/11/17 120/82   02/01/17 132/82   09/27/16 (!) 136/92                Failed - LDL on file in past 12 months    Recent Labs   Lab Test  02/01/17   1045   LDL  75            Failed - Microalbumin on file in past 12 months    Recent Labs   Lab Test  09/27/16   1033   MICROL  20   UMALCR  73.26*            Failed - HgbA1C in past 3 or 6 months    Recent Labs   Lab Test  04/11/17   1052   A1C  7.4*            Failed - Recent visit with authorizing provider's specialty in past 6 months    IV to PO - Antibiotics     None                   Passed - Patient is age 18 or older       Passed - Normal serum creatinine in past 12 months    Recent Labs   Lab Test  02/01/17   1045   CR  0.87             warfarin (COUMADIN) 5 MG tablet [Pharmacy Med Name: WARFARIN SODIUM 5 MG Tablet] 162 tablet 1     Sig: TAKE 1 AND 1/2 TABS ON TUE, THU AND SAT AND TAKE 2 TABS DAILY ON ALL OTHER DAYS OF THE WEEK AS DIRECTED BY COUMADIN CLINIC    Vitamin K Antagonists Failed    12/30/2017  7:42 PM       Failed - INR is within goal in the past 6 weeks    Confirm INR is within goal in the past 6 weeks.     Recent Labs   Lab Test 04/14/17   INR  1.7*                      Passed - Recent or future visit with authorizing provider's specialty    Patient had office visit in the last year or has a visit in the next 30 days with authorizing provider.  See chart review.              Passed - Patient is 18 years of age or older

## 2018-01-02 NOTE — TELEPHONE ENCOUNTER
Routing refill request to provider for review/approval because:  Labs out of range:  Microalbumin  Labs not current:  BP, Microalbumin, A1C  Patient needs to be seen because:  Due for follow up    Cari Urbina RN  [facility]

## 2018-03-27 ENCOUNTER — TELEPHONE (OUTPATIENT)
Dept: INTERNAL MEDICINE | Facility: CLINIC | Age: 73
End: 2018-03-27

## 2018-03-27 DIAGNOSIS — E11.65 TYPE 2 DIABETES MELLITUS WITH HYPERGLYCEMIA, WITHOUT LONG-TERM CURRENT USE OF INSULIN (H): ICD-10-CM

## 2018-03-27 DIAGNOSIS — E78.5 HYPERLIPIDEMIA WITH TARGET LDL LESS THAN 100: Primary | ICD-10-CM

## 2018-03-27 NOTE — TELEPHONE ENCOUNTER
"Last Written Prescription Date:  4/11/2017  Last Fill Quantity: 180,  # refills: 3   Last office visit: 4/11/2017 with prescribing provider:  Dr. Arizmendi   Future Office Visit:    Requested Prescriptions   Pending Prescriptions Disp Refills     metFORMIN (GLUCOPHAGE) 1000 MG tablet [Pharmacy Med Name: METFORMIN HCL 1000 MG Tablet] 180 tablet 1     Sig: TAKE 1 TABLET TWICE DAILY WITH MEALS.    Biguanide Agents Failed    3/27/2018 11:05 AM       Failed - Blood pressure less than 140/90 in past 6 months    BP Readings from Last 3 Encounters:   04/11/17 120/82   02/01/17 132/82   09/27/16 (!) 136/92                Failed - Patient has documented LDL within the past 12 mos.    Recent Labs   Lab Test  02/01/17   1045   LDL  75            Failed - Patient has had a Microalbumin in the past 12 mos.    Recent Labs   Lab Test  09/27/16   1033   MICROL  20   UMALCR  73.26*            Failed - Patient has documented A1c within the specified period of time.    Recent Labs   Lab Test  04/11/17   1052   A1C  7.4*            Failed - Recent (6 mo) or future (30 days) visit within the authorizing provider's specialty    Patient had office visit in the last 6 months or has a visit in the next 30 days with authorizing provider or within the authorizing provider's specialty.  See \"Patient Info\" tab in inbasket, or \"Choose Columns\" in Meds & Orders section of the refill encounter.           Passed - Patient is age 10 or older       Passed - Patient's CR is NOT>1.4 OR Patient's EGFR is NOT<45 within past 12 mos.    Recent Labs   Lab Test  02/01/17   1045   GFRESTIMATED  87   GFRESTBLACK  >90   GFR Calc         Recent Labs   Lab Test  02/01/17   1045   CR  0.87            Passed - Patient does NOT have a diagnosis of CHF.          "

## 2018-03-29 NOTE — TELEPHONE ENCOUNTER
Routing refill request to provider for review/approval because:  Labs not current:  LDL, Microalbumin, A1c.  Patient needs to be seen because:  Per protocol, patient needs to be seen every 6 months.     Lor Ramon RN

## 2018-03-29 NOTE — TELEPHONE ENCOUNTER
We are going to need some fasting blood work before any further medications are refilled.  Please let the patient know that orders have been placed and he should stop by fasting at a time of his convenience (in the next week or so) for lab work.    Ugo

## 2018-05-14 DIAGNOSIS — E11.65 TYPE 2 DIABETES MELLITUS WITH HYPERGLYCEMIA, WITHOUT LONG-TERM CURRENT USE OF INSULIN (H): ICD-10-CM

## 2018-05-15 RX ORDER — SITAGLIPTIN 100 MG/1
TABLET, FILM COATED ORAL
Qty: 90 TABLET | Refills: 0 | Status: SHIPPED | OUTPATIENT
Start: 2018-05-15 | End: 2018-07-16

## 2018-05-15 NOTE — TELEPHONE ENCOUNTER
"Requested Prescriptions   Pending Prescriptions Disp Refills     JANUVIA 100 MG tablet [Pharmacy Med Name: JANUVIA 100 MG Tablet] 90 tablet 0     Sig: TAKE 1 TABLET EVERY DAY (NEED MD APPOINTMENT)    DPP4 Inhibitors Protocol Failed    5/14/2018  6:36 PM       Failed - Blood pressure less than 140/90 in past 6 months    BP Readings from Last 3 Encounters:   04/11/17 120/82   02/01/17 132/82   09/27/16 (!) 136/92                Failed - LDL on file in past 12 months    Recent Labs   Lab Test  02/01/17   1045   LDL  75            Failed - Microalbumin on file in past 12 months    Recent Labs   Lab Test  09/27/16   1033   MICROL  20   UMALCR  73.26*            Failed - HgbA1C in past 3 or 6 months    If HgbA1C is 8 or greater, it needs to be on file within the past 3 months.  If less than 8, must be on file within the past 6 months.     Recent Labs   Lab Test  04/11/17   1052   A1C  7.4*            Failed - Normal serum creatinine in past 12 months    Recent Labs   Lab Test  02/01/17   1045   CR  0.87            Failed - Recent (6 mo) or future (30 days) visit within the authorizing provider's specialty    Patient had office visit in the last 6 months or has a visit in the next 30 days with authorizing provider.  See \"Patient Info\" tab in inbasket, or \"Choose Columns\" in Meds & Orders section of the refill encounter.           Passed - Patient is age 18 or older          Last Written Prescription Date:  3/8/18  Last Fill Quantity: 90,  # refills: 0   [place] Office Visit [place] [facility] prescribing provider:  4/11/18   Future Office Visit:       "

## 2018-07-10 DIAGNOSIS — E11.65 TYPE 2 DIABETES MELLITUS WITH HYPERGLYCEMIA (H): ICD-10-CM

## 2018-07-10 RX ORDER — BLOOD SUGAR DIAGNOSTIC
STRIP MISCELLANEOUS
Qty: 300 STRIP | Refills: 0 | Status: SHIPPED | OUTPATIENT
Start: 2018-07-10 | End: 2018-09-17

## 2018-07-10 NOTE — TELEPHONE ENCOUNTER
"Requested Prescriptions   Pending Prescriptions Disp Refills     ACCU-CHEK AURELIO PLUS test strip [Pharmacy Med Name: ACCU-CHEK AURELIO PLUS   Strip] 300 strip 3     Sig: TEST THREE TIMES DAILY AS DIRECTED.    Diabetic Supplies Protocol Failed    7/10/2018 10:15 AM       Failed - Recent (6 mo) or future (30 days) visit within the authorizing provider's specialty    Patient had office visit in the last 6 months or has a visit in the next 30 days with authorizing provider.  See \"Patient Info\" tab in inbasket, or \"Choose Columns\" in Meds & Orders section of the refill encounter.           Passed - Patient is 18 years of age or older          Last Written Prescription Date:  6-6-16  Last Fill Quantity: 1 box ,  # refills: 11   Last Office Visit with [facility], P or [facility] prescribing provider:  4/11/17   Future Office Visit:       "

## 2018-07-10 NOTE — TELEPHONE ENCOUNTER
Rx approved but patient is due for an office visit for recheck of diabetes.     Reception, Please contact patient to schedule recheck, come fasting.     Erin Armendariz RN. . .  7/10/2018, 5:01 PM

## 2018-07-11 NOTE — TELEPHONE ENCOUNTER
Pt does not live in this state anymore. He will contact his pharmacy to send this to the correct provider.   Thank you,  Nessa Benjamin- Pt Rep.

## 2018-07-16 DIAGNOSIS — E11.65 TYPE 2 DIABETES MELLITUS WITH HYPERGLYCEMIA, WITHOUT LONG-TERM CURRENT USE OF INSULIN (H): ICD-10-CM

## 2018-07-17 NOTE — TELEPHONE ENCOUNTER
"Requested Prescriptions   Pending Prescriptions Disp Refills     JANUVIA 100 MG tablet [Pharmacy Med Name: JANUVIA 100 MG Tablet] 90 tablet 0    Last Written Prescription Date:  05/15/2018  Last Fill Quantity: 90,  # refills: 0   Last office visit: 9/10/2015 with prescribing provider:  09/10/2015   Future Office Visit:     Sig: TAKE 1 TABLET EVERY DAY (NEED MD APPOINTMENT)    DPP4 Inhibitors Protocol Failed    7/16/2018 11:42 PM       Failed - Blood pressure less than 140/90 in past 6 months    BP Readings from Last 3 Encounters:   04/11/17 120/82   02/01/17 132/82   09/27/16 (!) 136/92                Failed - LDL on file in past 12 months    Recent Labs   Lab Test  02/01/17   1045   LDL  75            Failed - Microalbumin on file in past 12 months    Recent Labs   Lab Test  09/27/16   1033   MICROL  20   UMALCR  73.26*            Failed - HgbA1C in past 3 or 6 months    If HgbA1C is 8 or greater, it needs to be on file within the past 3 months.  If less than 8, must be on file within the past 6 months.     Recent Labs   Lab Test  04/11/17   1052   A1C  7.4*            Failed - Normal serum creatinine in past 12 months    Recent Labs   Lab Test  02/01/17   1045   CR  0.87            Failed - Recent (6 mo) or future (30 days) visit within the authorizing provider's specialty    Patient had office visit in the last 6 months or has a visit in the next 30 days with authorizing provider.  See \"Patient Info\" tab in inbasket, or \"Choose Columns\" in Meds & Orders section of the refill encounter.           Passed - Patient is age 18 or older          "

## 2018-07-18 RX ORDER — SITAGLIPTIN 100 MG/1
TABLET, FILM COATED ORAL
Qty: 15 TABLET | Refills: 0 | Status: SHIPPED | OUTPATIENT
Start: 2018-07-18 | End: 2018-08-03

## 2018-07-18 NOTE — TELEPHONE ENCOUNTER
Routing refill request to provider for review/approval because:  Ivone given x1 and patient did not follow up, please advise  Labs out of range:  Microalbumin  Labs not current:  BP, LDL, Microalbumin, A1C, Creatinine  Patient needs to be seen because it has been more than 1 year since last office visit.    Cari Urbina, RN  [facility]

## 2018-07-30 DIAGNOSIS — I10 HYPERTENSION GOAL BP (BLOOD PRESSURE) < 140/90: ICD-10-CM

## 2018-07-30 DIAGNOSIS — I48.19 PERSISTENT ATRIAL FIBRILLATION (H): ICD-10-CM

## 2018-07-30 NOTE — TELEPHONE ENCOUNTER
"Requested Prescriptions   Pending Prescriptions Disp Refills     furosemide (LASIX) 20 MG tablet [Pharmacy Med Name: FUROSEMIDE 20 MG Tablet] 90 tablet 0    Last Written Prescription Date:  5/24/18  Last Fill Quantity: 90,  # refills: 0   Last office visit: 4/11/2017 with prescribing provider:  4/11/17   Future Office Visit:     Sig: TAKE 1 TABLET EVERY DAY (NEED MD APPT)    Diuretics (Including Combos) Protocol Failed    7/30/2018  6:33 PM       Failed - Blood pressure under 140/90 in past 12 months    BP Readings from Last 3 Encounters:   04/11/17 120/82   02/01/17 132/82   09/27/16 (!) 136/92                Failed - Recent (12 mo) or future (30 days) visit within the authorizing provider's specialty    Patient had office visit in the last 12 months or has a visit in the next 30 days with authorizing provider or within the authorizing provider's specialty.  See \"Patient Info\" tab in inbasket, or \"Choose Columns\" in Meds & Orders section of the refill encounter.           Failed - Normal serum creatinine on file in past 12 months    Recent Labs   Lab Test  02/01/17   1045   CR  0.87             Failed - Normal serum potassium on file in past 12 months    Recent Labs   Lab Test  02/01/17   1045   POTASSIUM  3.8                   Failed - Normal serum sodium on file in past 12 months    Recent Labs   Lab Test  02/01/17   1045   NA  145*             Passed - Patient is age 18 or older        tamsulosin (FLOMAX) 0.4 MG capsule [Pharmacy Med Name: TAMSULOSIN HCL 0.4 MG Capsule]  Last Written Prescription Date:  5/24/18  Last Fill Quantity: 90,  # refills: 0   Last office visit: 4/11/2017 with prescribing provider:  4/11/17   Future Office Visit:     90 capsule 0     Sig: TAKE 1 CAPSULE EVERY DAY (NEED MD APPT)    Alpha Blockers Failed    7/30/2018  6:33 PM       Failed - Blood pressure under 140/90 in past 12 months    BP Readings from Last 3 Encounters:   04/11/17 120/82   02/01/17 132/82   09/27/16 (!) 136/92          " "      Failed - Recent (12 mo) or future (30 days) visit within the authorizing provider's specialty    Patient had office visit in the last 12 months or has a visit in the next 30 days with authorizing provider or within the authorizing provider's specialty.  See \"Patient Info\" tab in inbasket, or \"Choose Columns\" in Meds & Orders section of the refill encounter.           Failed - Patient does not have Tadalafil, Vardenafil, or Sildenafil on their medication list       Passed - Patient is 18 years of age or older        amLODIPine (NORVASC) 10 MG tablet [Pharmacy Med Name: AMLODIPINE BESYLATE 10 MG Tablet] 90 tablet 0    Last Written Prescription Date:  5/24/18  Last Fill Quantity: 90,  # refills: 0   Last office visit: 4/11/2017 with prescribing provider:  4/11/17   Future Office Visit:     Sig: TAKE 1 TABLET EVERY DAY (NEED MD APPT)    Calcium Channel Blockers Protocol  Failed    7/30/2018  6:33 PM       Failed - Blood pressure under 140/90 in past 12 months    BP Readings from Last 3 Encounters:   04/11/17 120/82   02/01/17 132/82   09/27/16 (!) 136/92                Failed - Recent (12 mo) or future (30 days) visit within the authorizing provider's specialty    Patient had office visit in the last 12 months or has a visit in the next 30 days with authorizing provider or within the authorizing provider's specialty.  See \"Patient Info\" tab in inbasket, or \"Choose Columns\" in Meds & Orders section of the refill encounter.           Failed - Normal serum creatinine on file in past 12 months    Recent Labs   Lab Test  02/01/17   1045   CR  0.87            Passed - Patient is age 18 or older        warfarin (COUMADIN) 5 MG tablet [Pharmacy Med Name: WARFARIN SODIUM 5 MG Tablet]  Last Written Prescription Date:  5/24/18  Last Fill Quantity: 162,  # refills: 0   Last office visit: 4/11/2017 with prescribing provider:  4/11/17   Future Office Visit:     162 tablet 0     Sig: TAKE 1 AND 1/2 TABS ON TUES, THURS & SAT AND " "TAKE 2 TABS DAILY ON ALL OTHER DAYS OF THE WEEK AS DIRECTED BY [facility]    Vitamin K Antagonists Failed    7/30/2018  6:33 PM       Failed - Recent (12 mo) or future (30 days) visit within the authorizing provider's specialty    Patient had office visit in the last 12 months or has a visit in the next 30 days with authorizing provider or within the authorizing provider's specialty.  See \"Patient Info\" tab in inbasket, or \"Choose Columns\" in Meds & Orders section of the refill encounter.           Failed - INR is within goal in the past 6 weeks    Confirm INR is within goal in the past 6 weeks.     Recent Labs   Lab Test 04/14/17   INR  1.7*                      Passed - Patient is 18 years of age or older        lisinopril (PRINIVIL/ZESTRIL) 40 MG tablet [Pharmacy Med Name: LISINOPRIL 40 MG Tablet] 90 tablet 0    Last Written Prescription Date:  5/24/18  Last Fill Quantity: 90,  # refills: 0   Last office visit: 4/11/2017 with prescribing provider:  4/11/17   Future Office Visit:     Sig: TAKE 1 TABLET EVERY DAY (NEED TO SEE DOCTOR GOLDEN FOR FURTHER REFILLS)    ACE Inhibitors (Including Combos) Protocol Failed    7/30/2018  6:33 PM       Failed - Blood pressure under 140/90 in past 12 months    BP Readings from Last 3 Encounters:   04/11/17 120/82   02/01/17 132/82   09/27/16 (!) 136/92                Failed - Recent (12 mo) or future (30 days) visit within the authorizing provider's specialty    Patient had office visit in the last 12 months or has a visit in the next 30 days with authorizing provider or within the authorizing provider's specialty.  See \"Patient Info\" tab in inbasket, or \"Choose Columns\" in Meds & Orders section of the refill encounter.           Failed - Normal serum creatinine on file in past 12 months    Recent Labs   Lab Test  02/01/17   1045   CR  0.87            Failed - Normal serum potassium on file in past 12 months    Recent Labs   Lab Test  02/01/17   1045   POTASSIUM  3.8      "       Passed - Patient is age 18 or older

## 2018-08-01 RX ORDER — AMLODIPINE BESYLATE 10 MG/1
TABLET ORAL
Qty: 90 TABLET | Refills: 0 | OUTPATIENT
Start: 2018-08-01

## 2018-08-01 RX ORDER — WARFARIN SODIUM 5 MG/1
TABLET ORAL
Qty: 162 TABLET | Refills: 0 | OUTPATIENT
Start: 2018-08-01

## 2018-08-01 RX ORDER — TAMSULOSIN HYDROCHLORIDE 0.4 MG/1
CAPSULE ORAL
Qty: 90 CAPSULE | Refills: 0 | OUTPATIENT
Start: 2018-08-01

## 2018-08-01 RX ORDER — FUROSEMIDE 20 MG
TABLET ORAL
Qty: 90 TABLET | Refills: 0 | OUTPATIENT
Start: 2018-08-01

## 2018-08-01 RX ORDER — LISINOPRIL 40 MG/1
TABLET ORAL
Qty: 90 TABLET | Refills: 0 | OUTPATIENT
Start: 2018-08-01

## 2018-08-01 NOTE — TELEPHONE ENCOUNTER
July 11, 2018   Nessa Benjamin        8:38 AM   Note      Pt does not live in this state anymore. He will contact his pharmacy to send this to the correct provider.   Thank you,  Nessa Benjamin- Pt Rep.               Declining.  Closing this encounter.  Tona Adair, BSN, RN

## 2018-08-03 DIAGNOSIS — E11.65 TYPE 2 DIABETES MELLITUS WITH HYPERGLYCEMIA, WITHOUT LONG-TERM CURRENT USE OF INSULIN (H): ICD-10-CM

## 2018-08-03 NOTE — TELEPHONE ENCOUNTER
"Requested Prescriptions   Pending Prescriptions Disp Refills     JANUVIA 100 MG tablet [Pharmacy Med Name: JANUVIA 100 MG Tablet] 15 tablet 0    Last Written Prescription Date:  07/18/2018  Last Fill Quantity: 15,  # refills: 0   Last office visit: 9/10/2015 with prescribing provider:  09/10/2015   Future Office Visit:     Sig: TAKE 1 TABLET EVERY DAY (NEED MD APPOINTMENT)    DPP4 Inhibitors Protocol Failed    8/3/2018  4:21 PM       Failed - Blood pressure less than 140/90 in past 6 months    BP Readings from Last 3 Encounters:   04/11/17 120/82   02/01/17 132/82   09/27/16 (!) 136/92                Failed - LDL on file in past 12 months    Recent Labs   Lab Test  02/01/17   1045   LDL  75            Failed - Microalbumin on file in past 12 months    Recent Labs   Lab Test  09/27/16   1033   MICROL  20   UMALCR  73.26*            Failed - HgbA1C in past 3 or 6 months    If HgbA1C is 8 or greater, it needs to be on file within the past 3 months.  If less than 8, must be on file within the past 6 months.     Recent Labs   Lab Test  04/11/17   1052   A1C  7.4*            Failed - Normal serum creatinine in past 12 months    Recent Labs   Lab Test  02/01/17   1045   CR  0.87            Failed - Recent (6 mo) or future (30 days) visit within the authorizing provider's specialty    Patient had office visit in the last 6 months or has a visit in the next 30 days with authorizing provider.  See \"Patient Info\" tab in inbasket, or \"Choose Columns\" in Meds & Orders section of the refill encounter.           Passed - Patient is age 18 or older          "

## 2018-08-06 RX ORDER — SITAGLIPTIN 100 MG/1
TABLET, FILM COATED ORAL
Qty: 7 TABLET | Refills: 0 | Status: SHIPPED | OUTPATIENT
Start: 2018-08-06

## 2018-08-06 NOTE — TELEPHONE ENCOUNTER
Routing refill request to provider for review/approval because:  Ivone given x1 and patient did not follow up, please advise  Labs out of range:  Microalbumin  Labs not current:  BP, LDL, Microalbumin A1C  Patient needs to be seen because:  Overdue for diabetic check  Patient needs to be seen because it has been more than 1 year since last office visit.    Cari Urbina, SHAWNAN, RN  [facility]

## 2018-08-07 DIAGNOSIS — E11.65 TYPE 2 DIABETES MELLITUS WITH HYPERGLYCEMIA, WITHOUT LONG-TERM CURRENT USE OF INSULIN (H): ICD-10-CM

## 2018-08-07 NOTE — TELEPHONE ENCOUNTER
"Requested Prescriptions   Pending Prescriptions Disp Refills     metFORMIN (GLUCOPHAGE) 1000 MG tablet [Pharmacy Med Name: METFORMIN HCL 1000 MG Tablet] 180 tablet 1     Sig: TAKE 1 TABLET TWICE DAILY WITH MEALS.    Biguanide Agents Failed    8/7/2018  3:21 PM       Failed - Blood pressure less than 140/90 in past 6 months    BP Readings from Last 3 Encounters:   04/11/17 120/82   02/01/17 132/82   09/27/16 (!) 136/92                Failed - Patient has documented LDL within the past 12 mos.    Recent Labs   Lab Test  02/01/17   1045   LDL  75            Failed - Patient has had a Microalbumin in the past 12 mos.    Recent Labs   Lab Test  09/27/16   1033   MICROL  20   UMALCR  73.26*            Failed - Patient has documented A1c within the specified period of time.    If HgbA1C is 8 or greater, it needs to be on file within the past 3 months.  If less than 8, must be on file within the past 6 months.     Recent Labs   Lab Test  04/11/17   1052   A1C  7.4*            Failed - Recent (6 mo) or future (30 days) visit within the authorizing provider's specialty    Patient had office visit in the last 6 months or has a visit in the next 30 days with authorizing provider or within the authorizing provider's specialty.  See \"Patient Info\" tab in inbasket, or \"Choose Columns\" in Meds & Orders section of the refill encounter.           Passed - Patient is age 10 or older       Passed - Patient's CR is NOT>1.4 OR Patient's EGFR is NOT<45 within past 12 mos.    Recent Labs   Lab Test  02/01/17   1045   GFRESTIMATED  87   GFRESTBLACK  >90   GFR Calc         Recent Labs   Lab Test  02/01/17   1045   CR  0.87            Passed - Patient does NOT have a diagnosis of CHF.        Last Written Prescription Date:  3/29/18  Last Fill Quantity: 180,  # refills: 1   Last office visit: 9/10/2015 with prescribing provider:     Future Office Visit:      "

## 2018-08-08 NOTE — TELEPHONE ENCOUNTER
Given the patient's history of diabetes and the fact that he has not been seen since April of last year, please invite him to visit us. In fact, you may want to mention that there will be no more prescription refills until we've had the honor of his presence. This is because we are concerned about his ongoing medical care. Poorly controlled diabetes could lead to cardiovascular disease or even a stroke. It is best to care for this on at least a semiannual basis.    Thank you for relaying this message.    Ugo

## 2018-08-08 NOTE — TELEPHONE ENCOUNTER
Routing refill request to provider for review/approval because:  Ivone given x1 and patient did not follow up, please advise  Labs out of range:  Microalbumin  Labs not current:  BP, LDL, Microalbumin, A1C  Patient needs to be seen because it has been more than 1 year since last office visit.    Cari Urbina, BSN, RN  [facility]

## 2018-08-13 NOTE — TELEPHONE ENCOUNTER
Pt has moved to [place] , and switched practices.   He has spoken to [facility] about his address change. But they keep getting the addresses wrong and sending his medications to us.   Kathe Fisher MA on 8/13/2018 at 2:43 PM

## 2018-08-28 DIAGNOSIS — E78.5 HYPERLIPIDEMIA WITH TARGET LDL LESS THAN 100: ICD-10-CM

## 2018-08-28 NOTE — TELEPHONE ENCOUNTER
"Requested Prescriptions   Pending Prescriptions Disp Refills     lovastatin (MEVACOR) 40 MG tablet [Pharmacy Med Name: LOVASTATIN 40 MG Tablet] 180 tablet 3    Last Written Prescription Date:  4/11/2017  Last Fill Quantity: 180,  # refills: 3   Last office visit: 4/11/2017 with prescribing provider:  Dr. Arizmendi   Future Office Visit:     Sig: TAKE 1 TABLET TWICE DAILY    Statins Protocol Failed    8/28/2018  2:49 PM       Failed - LDL on file in past 12 months    Recent Labs   Lab Test  02/01/17   1045   LDL  75            Failed - Recent (12 mo) or future (30 days) visit within the authorizing provider's specialty    Patient had office visit in the last 12 months or has a visit in the next 30 days with authorizing provider or within the authorizing provider's specialty.  See \"Patient Info\" tab in inbasket, or \"Choose Columns\" in Meds & Orders section of the refill encounter.           Passed - No abnormal creatine kinase in past 12 months    No lab results found.            Passed - Patient is age 18 or older          "

## 2018-08-29 RX ORDER — LOVASTATIN 40 MG
TABLET ORAL
Qty: 60 TABLET | Refills: 0 | Status: SHIPPED | OUTPATIENT
Start: 2018-08-29 | End: 2018-11-09

## 2018-08-29 NOTE — TELEPHONE ENCOUNTER
Routing refill request to provider for review/approval because:  Labs not current:  LDL  A break in medication, should have been out of meds 4 months ago.   Patient needs to be seen because it has been more than 1 year since last office visit.    SHAWNA MolinaN, RN  [facility]

## 2018-09-17 DIAGNOSIS — E11.65 TYPE 2 DIABETES MELLITUS WITH HYPERGLYCEMIA, UNSPECIFIED LONG TERM INSULIN USE STATUS: Primary | ICD-10-CM

## 2018-09-17 DIAGNOSIS — E11.65 TYPE 2 DIABETES MELLITUS WITH HYPERGLYCEMIA (H): ICD-10-CM

## 2018-09-18 NOTE — TELEPHONE ENCOUNTER
"Requested Prescriptions   Pending Prescriptions Disp Refills     ACCU-CHEK AURELIO PLUS test strip [Pharmacy Med Name: ACCU-CHEK AURELIO PLUS   Strip] 300 strip 0    Last Written Prescription Date:  7/10/18  Last Fill Quantity: 300,  # refills: 0   Last office visit: 9/10/2015 with prescribing provider:  4/11/17   Future Office Visit:     Sig: TEST THREE TIMES DAILY AS DIRECTED.  DUE FOR OFFICE VISIT    Diabetic Supplies Protocol Failed    9/17/2018  4:53 PM       Failed - Recent (6 mo) or future (30 days) visit within the authorizing provider's specialty    Patient had office visit in the last 6 months or has a visit in the next 30 days with authorizing provider.  See \"Patient Info\" tab in inbasket, or \"Choose Columns\" in Meds & Orders section of the refill encounter.           Passed - Patient is 18 years of age or older          "

## 2018-09-19 RX ORDER — BLOOD SUGAR DIAGNOSTIC
STRIP MISCELLANEOUS
Qty: 90 STRIP | Refills: 0 | Status: SHIPPED | OUTPATIENT
Start: 2018-09-19 | End: 2018-11-09

## 2018-09-19 NOTE — TELEPHONE ENCOUNTER
Routing refill request to provider for review/approval because:  Patient needs to be seen because it has been more than 1 year since last office visit.  T'd up 1 month for provider review.    Will forward to schedulers to schedule patient for OV.  Tona Adair RN

## 2018-09-20 NOTE — TELEPHONE ENCOUNTER
Patient has moved to [place], he would like to cancel this prescription request, he no longer needs medication through [facility]. He has notified the pharmacy but they keep sending request to [facility]. Is there anyway to flag his file so no more prescriptions get filled here.

## 2018-11-09 DIAGNOSIS — E78.5 HYPERLIPIDEMIA WITH TARGET LDL LESS THAN 100: ICD-10-CM

## 2018-11-09 DIAGNOSIS — Z79.4 TYPE 2 DIABETES MELLITUS WITH HYPERGLYCEMIA, WITH LONG-TERM CURRENT USE OF INSULIN (H): ICD-10-CM

## 2018-11-09 DIAGNOSIS — E11.65 TYPE 2 DIABETES MELLITUS WITH HYPERGLYCEMIA, WITH LONG-TERM CURRENT USE OF INSULIN (H): ICD-10-CM

## 2018-11-12 RX ORDER — LOVASTATIN 40 MG
TABLET ORAL
Qty: 60 TABLET | Refills: 0 | Status: SHIPPED | OUTPATIENT
Start: 2018-11-12

## 2018-11-12 RX ORDER — BLOOD SUGAR DIAGNOSTIC
STRIP MISCELLANEOUS
Qty: 300 STRIP | Refills: 0 | Status: SHIPPED | OUTPATIENT
Start: 2018-11-12 | End: 2019-01-16

## 2018-11-12 NOTE — TELEPHONE ENCOUNTER
Routing refill request to provider for review/approval because:  Ivone given x1 and patient did not follow up, please advise    Rabia Arrington RN

## 2019-01-16 ENCOUNTER — TELEPHONE (OUTPATIENT)
Dept: INTERNAL MEDICINE | Facility: CLINIC | Age: 74
End: 2019-01-16

## 2019-01-16 DIAGNOSIS — E11.65 TYPE 2 DIABETES MELLITUS WITH HYPERGLYCEMIA, WITH LONG-TERM CURRENT USE OF INSULIN (H): ICD-10-CM

## 2019-01-16 DIAGNOSIS — Z79.4 TYPE 2 DIABETES MELLITUS WITH HYPERGLYCEMIA, WITH LONG-TERM CURRENT USE OF INSULIN (H): ICD-10-CM

## 2019-01-18 NOTE — TELEPHONE ENCOUNTER
Eusebio returns call and message is relayed.  Eusebio states that he has moved to [place] and will no longer be coming here.  Please cancel this refill request.

## 2019-01-18 NOTE — TELEPHONE ENCOUNTER
"Requested Prescriptions   Pending Prescriptions Disp Refills     ACCU-CHEK AURELIO PLUS test strip [Pharmacy Med Name: ACCU-CHEK AURELIO PLUS   Strip] 300 strip 0    Last Written Prescription Date:  11/12/18  Last Fill Quantity: 300,  # refills: 0   Last office visit: 4/11/17 with prescribing provider:     Future Office Visit:     Sig: TEST THREE TIMES DAILY AS DIRECTED.  DUE FOR OFFICE VISIT    Diabetic Supplies Protocol Failed - 1/16/2019  4:57 PM       Failed - Recent (6 mo) or future (30 days) visit within the authorizing provider's specialty    Patient had office visit in the last 6 months or has a visit in the next 30 days with authorizing provider.  See \"Patient Info\" tab in inbasket, or \"Choose Columns\" in Meds & Orders section of the refill encounter.           Passed - Medication is active on med list       Passed - Patient is 18 years of age or older      Routing refill request to provider for review/approval because:  Patient needs to be seen because it has been more than 1 year since last office visit.  Due for Diabetes every 6 months    T'd up 1 month for provider review.    Will forward to schedulers to schedule patient for OV.    Jory Melo RN            "

## 2019-02-13 DIAGNOSIS — Z79.4 TYPE 2 DIABETES MELLITUS WITH HYPERGLYCEMIA, WITH LONG-TERM CURRENT USE OF INSULIN (H): ICD-10-CM

## 2019-02-13 DIAGNOSIS — E11.65 TYPE 2 DIABETES MELLITUS WITH HYPERGLYCEMIA, WITH LONG-TERM CURRENT USE OF INSULIN (H): ICD-10-CM

## 2019-02-14 NOTE — TELEPHONE ENCOUNTER
Per TE on 1/16/19, patient moved to [place] and no longer seen at our clinic. RX refused and pharmacy notified.     SHAWNA MolinaN, RN  [facility]

## 2019-02-14 NOTE — TELEPHONE ENCOUNTER
"Requested Prescriptions   Pending Prescriptions Disp Refills     blood glucose (ACCU-CHEK AURELIO PLUS) test strip [Pharmacy Med Name: ACCU-CHEK AURELIO PLUS   Strip] 100 strip 0    Last Written Prescription Date:  1/18/18  Last Fill Quantity: 100,  # refills: 0   Last office visit: 4/11/2017 with prescribing provider:  4/11/17   Future Office Visit:   Sig: TEST THREE TIMES DAILY AS DIRECTED. NEEDS OFFICE VISIT BEFORE ADDITIONAL REFILLS.    Diabetic Supplies Protocol Failed - 2/13/2019  4:47 PM       Failed - Recent (6 mo) or future (30 days) visit within the authorizing provider's specialty    Patient had office visit in the last 6 months or has a visit in the next 30 days with authorizing provider.  See \"Patient Info\" tab in inbasket, or \"Choose Columns\" in Meds & Orders section of the refill encounter.           Passed - Medication is active on med list       Passed - Patient is 18 years of age or older        "